# Patient Record
Sex: FEMALE | Race: ASIAN | NOT HISPANIC OR LATINO | URBAN - METROPOLITAN AREA
[De-identification: names, ages, dates, MRNs, and addresses within clinical notes are randomized per-mention and may not be internally consistent; named-entity substitution may affect disease eponyms.]

---

## 2021-04-23 ENCOUNTER — INPATIENT (INPATIENT)
Facility: HOSPITAL | Age: 81
LOS: 4 days | Discharge: HOME | End: 2021-04-28
Attending: HOSPITALIST | Admitting: HOSPITALIST
Payer: MEDICARE

## 2021-04-23 VITALS
TEMPERATURE: 98 F | RESPIRATION RATE: 20 BRPM | OXYGEN SATURATION: 88 % | WEIGHT: 128.09 LBS | DIASTOLIC BLOOD PRESSURE: 72 MMHG | HEART RATE: 107 BPM | SYSTOLIC BLOOD PRESSURE: 135 MMHG

## 2021-04-23 PROCEDURE — 99285 EMERGENCY DEPT VISIT HI MDM: CPT | Mod: CS

## 2021-04-23 RX ORDER — ACETAMINOPHEN 500 MG
650 TABLET ORAL ONCE
Refills: 0 | Status: COMPLETED | OUTPATIENT
Start: 2021-04-23 | End: 2021-04-23

## 2021-04-23 RX ORDER — SODIUM CHLORIDE 9 MG/ML
1000 INJECTION, SOLUTION INTRAVENOUS ONCE
Refills: 0 | Status: COMPLETED | OUTPATIENT
Start: 2021-04-23 | End: 2021-04-23

## 2021-04-23 NOTE — ED ADULT NURSE NOTE - OBJECTIVE STATEMENT
patient presents c/o weakness x 2-3 weeks a/w sob, worsening today with pulse ox in 80s at home. has been ambulating independently around house, denies contacts w/ anyone covid +. o2 improved to 94% on 2L NC. denies cp/n/v/d.

## 2021-04-23 NOTE — ED ADULT TRIAGE NOTE - CHIEF COMPLAINT QUOTE
Pt came c/o body aches, cough and dyspnea x 3 weeks that got worse today, pt's SPO2 in triage is 88% on RA

## 2021-04-24 DIAGNOSIS — R09.89 OTHER SPECIFIED SYMPTOMS AND SIGNS INVOLVING THE CIRCULATORY AND RESPIRATORY SYSTEMS: ICD-10-CM

## 2021-04-24 LAB
ALBUMIN SERPL ELPH-MCNC: 3.5 G/DL — SIGNIFICANT CHANGE UP (ref 3.5–5.2)
ALBUMIN SERPL ELPH-MCNC: 3.6 G/DL — SIGNIFICANT CHANGE UP (ref 3.5–5.2)
ALP SERPL-CCNC: 76 U/L — SIGNIFICANT CHANGE UP (ref 30–115)
ALP SERPL-CCNC: 76 U/L — SIGNIFICANT CHANGE UP (ref 30–115)
ALT FLD-CCNC: 19 U/L — SIGNIFICANT CHANGE UP (ref 0–41)
ALT FLD-CCNC: 20 U/L — SIGNIFICANT CHANGE UP (ref 0–41)
ANION GAP SERPL CALC-SCNC: 15 MMOL/L — HIGH (ref 7–14)
ANION GAP SERPL CALC-SCNC: 15 MMOL/L — HIGH (ref 7–14)
AST SERPL-CCNC: 26 U/L — SIGNIFICANT CHANGE UP (ref 0–41)
AST SERPL-CCNC: 29 U/L — SIGNIFICANT CHANGE UP (ref 0–41)
BASE EXCESS BLDA CALC-SCNC: 1.8 MMOL/L — SIGNIFICANT CHANGE UP (ref -2–2)
BASOPHILS # BLD AUTO: 0.01 K/UL — SIGNIFICANT CHANGE UP (ref 0–0.2)
BASOPHILS # BLD AUTO: 0.02 K/UL — SIGNIFICANT CHANGE UP (ref 0–0.2)
BASOPHILS NFR BLD AUTO: 0.2 % — SIGNIFICANT CHANGE UP (ref 0–1)
BASOPHILS NFR BLD AUTO: 0.3 % — SIGNIFICANT CHANGE UP (ref 0–1)
BILIRUB SERPL-MCNC: 0.7 MG/DL — SIGNIFICANT CHANGE UP (ref 0.2–1.2)
BILIRUB SERPL-MCNC: 0.9 MG/DL — SIGNIFICANT CHANGE UP (ref 0.2–1.2)
BUN SERPL-MCNC: 17 MG/DL — SIGNIFICANT CHANGE UP (ref 10–20)
BUN SERPL-MCNC: 19 MG/DL — SIGNIFICANT CHANGE UP (ref 10–20)
CALCIUM SERPL-MCNC: 8.9 MG/DL — SIGNIFICANT CHANGE UP (ref 8.5–10.1)
CALCIUM SERPL-MCNC: 9.1 MG/DL — SIGNIFICANT CHANGE UP (ref 8.5–10.1)
CHLORIDE SERPL-SCNC: 103 MMOL/L — SIGNIFICANT CHANGE UP (ref 98–110)
CHLORIDE SERPL-SCNC: 99 MMOL/L — SIGNIFICANT CHANGE UP (ref 98–110)
CO2 SERPL-SCNC: 20 MMOL/L — SIGNIFICANT CHANGE UP (ref 17–32)
CO2 SERPL-SCNC: 21 MMOL/L — SIGNIFICANT CHANGE UP (ref 17–32)
CREAT SERPL-MCNC: 0.6 MG/DL — LOW (ref 0.7–1.5)
CREAT SERPL-MCNC: 0.8 MG/DL — SIGNIFICANT CHANGE UP (ref 0.7–1.5)
CRP SERPL-MCNC: 94 MG/L — HIGH
D DIMER BLD IA.RAPID-MCNC: 1555 NG/ML DDU — HIGH (ref 0–230)
EOSINOPHIL # BLD AUTO: 0 K/UL — SIGNIFICANT CHANGE UP (ref 0–0.7)
EOSINOPHIL # BLD AUTO: 0.08 K/UL — SIGNIFICANT CHANGE UP (ref 0–0.7)
EOSINOPHIL NFR BLD AUTO: 0 % — SIGNIFICANT CHANGE UP (ref 0–8)
EOSINOPHIL NFR BLD AUTO: 1 % — SIGNIFICANT CHANGE UP (ref 0–8)
GAS PNL BLDA: SIGNIFICANT CHANGE UP
GLUCOSE BLDC GLUCOMTR-MCNC: 134 MG/DL — HIGH (ref 70–99)
GLUCOSE BLDC GLUCOMTR-MCNC: 162 MG/DL — HIGH (ref 70–99)
GLUCOSE BLDC GLUCOMTR-MCNC: 176 MG/DL — HIGH (ref 70–99)
GLUCOSE BLDC GLUCOMTR-MCNC: 204 MG/DL — HIGH (ref 70–99)
GLUCOSE SERPL-MCNC: 185 MG/DL — HIGH (ref 70–99)
GLUCOSE SERPL-MCNC: 185 MG/DL — HIGH (ref 70–99)
HCO3 BLDA-SCNC: 25 MMOL/L — SIGNIFICANT CHANGE UP (ref 23–27)
HCT VFR BLD CALC: 38.5 % — SIGNIFICANT CHANGE UP (ref 37–47)
HCT VFR BLD CALC: 39.3 % — SIGNIFICANT CHANGE UP (ref 37–47)
HGB BLD-MCNC: 13.1 G/DL — SIGNIFICANT CHANGE UP (ref 12–16)
HGB BLD-MCNC: 13.3 G/DL — SIGNIFICANT CHANGE UP (ref 12–16)
HOROWITZ INDEX BLDA+IHG-RTO: 32 — SIGNIFICANT CHANGE UP
IMM GRANULOCYTES NFR BLD AUTO: 0.5 % — HIGH (ref 0.1–0.3)
IMM GRANULOCYTES NFR BLD AUTO: 0.8 % — HIGH (ref 0.1–0.3)
LYMPHOCYTES # BLD AUTO: 0.53 K/UL — LOW (ref 1.2–3.4)
LYMPHOCYTES # BLD AUTO: 0.81 K/UL — LOW (ref 1.2–3.4)
LYMPHOCYTES # BLD AUTO: 10.2 % — LOW (ref 20.5–51.1)
LYMPHOCYTES # BLD AUTO: 9.4 % — LOW (ref 20.5–51.1)
MCHC RBC-ENTMCNC: 32.4 PG — HIGH (ref 27–31)
MCHC RBC-ENTMCNC: 32.5 PG — HIGH (ref 27–31)
MCHC RBC-ENTMCNC: 33.8 G/DL — SIGNIFICANT CHANGE UP (ref 32–37)
MCHC RBC-ENTMCNC: 34 G/DL — SIGNIFICANT CHANGE UP (ref 32–37)
MCV RBC AUTO: 95.3 FL — SIGNIFICANT CHANGE UP (ref 81–99)
MCV RBC AUTO: 96.1 FL — SIGNIFICANT CHANGE UP (ref 81–99)
MONOCYTES # BLD AUTO: 0.1 K/UL — SIGNIFICANT CHANGE UP (ref 0.1–0.6)
MONOCYTES # BLD AUTO: 0.44 K/UL — SIGNIFICANT CHANGE UP (ref 0.1–0.6)
MONOCYTES NFR BLD AUTO: 1.8 % — SIGNIFICANT CHANGE UP (ref 1.7–9.3)
MONOCYTES NFR BLD AUTO: 5.6 % — SIGNIFICANT CHANGE UP (ref 1.7–9.3)
NEUTROPHILS # BLD AUTO: 4.95 K/UL — SIGNIFICANT CHANGE UP (ref 1.4–6.5)
NEUTROPHILS # BLD AUTO: 6.5 K/UL — SIGNIFICANT CHANGE UP (ref 1.4–6.5)
NEUTROPHILS NFR BLD AUTO: 82.1 % — HIGH (ref 42.2–75.2)
NEUTROPHILS NFR BLD AUTO: 88.1 % — HIGH (ref 42.2–75.2)
NRBC # BLD: 0 /100 WBCS — SIGNIFICANT CHANGE UP (ref 0–0)
NRBC # BLD: 0 /100 WBCS — SIGNIFICANT CHANGE UP (ref 0–0)
PCO2 BLDA: 36 MMHG — LOW (ref 38–42)
PH BLDA: 7.46 — HIGH (ref 7.38–7.42)
PLATELET # BLD AUTO: 411 K/UL — HIGH (ref 130–400)
PLATELET # BLD AUTO: 414 K/UL — HIGH (ref 130–400)
PO2 BLDA: 74 MMHG — LOW (ref 78–95)
POTASSIUM SERPL-MCNC: 4 MMOL/L — SIGNIFICANT CHANGE UP (ref 3.5–5)
POTASSIUM SERPL-MCNC: 4.1 MMOL/L — SIGNIFICANT CHANGE UP (ref 3.5–5)
POTASSIUM SERPL-SCNC: 4 MMOL/L — SIGNIFICANT CHANGE UP (ref 3.5–5)
POTASSIUM SERPL-SCNC: 4.1 MMOL/L — SIGNIFICANT CHANGE UP (ref 3.5–5)
PROCALCITONIN SERPL-MCNC: 0.08 NG/ML — SIGNIFICANT CHANGE UP (ref 0.02–0.1)
PROT SERPL-MCNC: 6.4 G/DL — SIGNIFICANT CHANGE UP (ref 6–8)
PROT SERPL-MCNC: 6.9 G/DL — SIGNIFICANT CHANGE UP (ref 6–8)
RAPID RVP RESULT: DETECTED
RBC # BLD: 4.04 M/UL — LOW (ref 4.2–5.4)
RBC # BLD: 4.09 M/UL — LOW (ref 4.2–5.4)
RBC # FLD: 12.7 % — SIGNIFICANT CHANGE UP (ref 11.5–14.5)
RBC # FLD: 12.8 % — SIGNIFICANT CHANGE UP (ref 11.5–14.5)
SAO2 % BLDA: 96 % — SIGNIFICANT CHANGE UP (ref 94–98)
SARS-COV-2 RNA SPEC QL NAA+PROBE: DETECTED
SODIUM SERPL-SCNC: 135 MMOL/L — SIGNIFICANT CHANGE UP (ref 135–146)
SODIUM SERPL-SCNC: 138 MMOL/L — SIGNIFICANT CHANGE UP (ref 135–146)
WBC # BLD: 5.62 K/UL — SIGNIFICANT CHANGE UP (ref 4.8–10.8)
WBC # BLD: 7.91 K/UL — SIGNIFICANT CHANGE UP (ref 4.8–10.8)
WBC # FLD AUTO: 5.62 K/UL — SIGNIFICANT CHANGE UP (ref 4.8–10.8)
WBC # FLD AUTO: 7.91 K/UL — SIGNIFICANT CHANGE UP (ref 4.8–10.8)

## 2021-04-24 PROCEDURE — 93010 ELECTROCARDIOGRAM REPORT: CPT

## 2021-04-24 PROCEDURE — 71045 X-RAY EXAM CHEST 1 VIEW: CPT | Mod: 26

## 2021-04-24 PROCEDURE — 93970 EXTREMITY STUDY: CPT | Mod: 26

## 2021-04-24 PROCEDURE — 71275 CT ANGIOGRAPHY CHEST: CPT | Mod: 26,MA

## 2021-04-24 PROCEDURE — 99223 1ST HOSP IP/OBS HIGH 75: CPT | Mod: CS

## 2021-04-24 RX ORDER — DEXTROSE 50 % IN WATER 50 %
25 SYRINGE (ML) INTRAVENOUS ONCE
Refills: 0 | Status: DISCONTINUED | OUTPATIENT
Start: 2021-04-24 | End: 2021-04-28

## 2021-04-24 RX ORDER — DEXTROSE 50 % IN WATER 50 %
12.5 SYRINGE (ML) INTRAVENOUS ONCE
Refills: 0 | Status: DISCONTINUED | OUTPATIENT
Start: 2021-04-24 | End: 2021-04-28

## 2021-04-24 RX ORDER — SIMVASTATIN 20 MG/1
40 TABLET, FILM COATED ORAL AT BEDTIME
Refills: 0 | Status: DISCONTINUED | OUTPATIENT
Start: 2021-04-24 | End: 2021-04-28

## 2021-04-24 RX ORDER — DEXTROSE 50 % IN WATER 50 %
15 SYRINGE (ML) INTRAVENOUS ONCE
Refills: 0 | Status: DISCONTINUED | OUTPATIENT
Start: 2021-04-24 | End: 2021-04-28

## 2021-04-24 RX ORDER — DEXAMETHASONE 0.5 MG/5ML
6 ELIXIR ORAL ONCE
Refills: 0 | Status: COMPLETED | OUTPATIENT
Start: 2021-04-24 | End: 2021-04-24

## 2021-04-24 RX ORDER — LISINOPRIL 2.5 MG/1
10 TABLET ORAL DAILY
Refills: 0 | Status: DISCONTINUED | OUTPATIENT
Start: 2021-04-24 | End: 2021-04-28

## 2021-04-24 RX ORDER — SODIUM CHLORIDE 9 MG/ML
1000 INJECTION, SOLUTION INTRAVENOUS
Refills: 0 | Status: DISCONTINUED | OUTPATIENT
Start: 2021-04-24 | End: 2021-04-28

## 2021-04-24 RX ORDER — GLUCAGON INJECTION, SOLUTION 0.5 MG/.1ML
1 INJECTION, SOLUTION SUBCUTANEOUS ONCE
Refills: 0 | Status: DISCONTINUED | OUTPATIENT
Start: 2021-04-24 | End: 2021-04-28

## 2021-04-24 RX ORDER — PANTOPRAZOLE SODIUM 20 MG/1
40 TABLET, DELAYED RELEASE ORAL
Refills: 0 | Status: DISCONTINUED | OUTPATIENT
Start: 2021-04-24 | End: 2021-04-28

## 2021-04-24 RX ORDER — CHLORHEXIDINE GLUCONATE 213 G/1000ML
1 SOLUTION TOPICAL
Refills: 0 | Status: DISCONTINUED | OUTPATIENT
Start: 2021-04-24 | End: 2021-04-28

## 2021-04-24 RX ORDER — INSULIN LISPRO 100/ML
VIAL (ML) SUBCUTANEOUS
Refills: 0 | Status: DISCONTINUED | OUTPATIENT
Start: 2021-04-24 | End: 2021-04-28

## 2021-04-24 RX ORDER — ENOXAPARIN SODIUM 100 MG/ML
40 INJECTION SUBCUTANEOUS DAILY
Refills: 0 | Status: DISCONTINUED | OUTPATIENT
Start: 2021-04-24 | End: 2021-04-26

## 2021-04-24 RX ORDER — AMLODIPINE BESYLATE 2.5 MG/1
5 TABLET ORAL DAILY
Refills: 0 | Status: DISCONTINUED | OUTPATIENT
Start: 2021-04-24 | End: 2021-04-28

## 2021-04-24 RX ORDER — DEXAMETHASONE 0.5 MG/5ML
6 ELIXIR ORAL DAILY
Refills: 0 | Status: DISCONTINUED | OUTPATIENT
Start: 2021-04-25 | End: 2021-04-28

## 2021-04-24 RX ADMIN — Medication 650 MILLIGRAM(S): at 00:03

## 2021-04-24 RX ADMIN — ENOXAPARIN SODIUM 40 MILLIGRAM(S): 100 INJECTION SUBCUTANEOUS at 11:11

## 2021-04-24 RX ADMIN — Medication 1: at 11:12

## 2021-04-24 RX ADMIN — Medication 6 MILLIGRAM(S): at 05:58

## 2021-04-24 RX ADMIN — SIMVASTATIN 40 MILLIGRAM(S): 20 TABLET, FILM COATED ORAL at 21:20

## 2021-04-24 RX ADMIN — Medication 2: at 17:06

## 2021-04-24 RX ADMIN — AMLODIPINE BESYLATE 5 MILLIGRAM(S): 2.5 TABLET ORAL at 11:11

## 2021-04-24 RX ADMIN — SODIUM CHLORIDE 1000 MILLILITER(S): 9 INJECTION, SOLUTION INTRAVENOUS at 00:03

## 2021-04-24 RX ADMIN — LISINOPRIL 10 MILLIGRAM(S): 2.5 TABLET ORAL at 17:17

## 2021-04-24 RX ADMIN — PANTOPRAZOLE SODIUM 40 MILLIGRAM(S): 20 TABLET, DELAYED RELEASE ORAL at 11:10

## 2021-04-24 NOTE — ED PROVIDER NOTE - OBJECTIVE STATEMENT
81yo F with PMH of DM on Metformin presenting to ED with myalgias and cough x 3 weeks and SOB today. Patient's  is ill and in the hospital with covid. Denies any f/c, CP, abd pain, back pain, n/v/d, rashes, calf pain/swelling, or injuries/traumas/falls. Nonsmoker.

## 2021-04-24 NOTE — ED PROVIDER NOTE - ATTENDING CONTRIBUTION TO CARE
I personally evaluated the patient. I reviewed the Resident’s or Physician Assistant’s note (as assigned above), and agree with the findings and plan except as documented in my note.  80 F with hx of DM presents with several days of body aches associated with coughing. Pt denies fever, CP, SOB, no lightheadedness, no LOC. + sick contact in , no Covid test done recently. VS reviewed and pt is hypoxic on RA, pt non-toxic appearing, NAD. Head ncat, MMM, neck supple, normal ROM, normal s1s2 without any murmurs, Lungs CTAB with normal work of breathing. abd +BS, s/nd/nt, extremities wnl, neuro exam grossly normal. No acute skin rashes. Plan is ekg, labs, imaging, meds as needed and manage accordingly.

## 2021-04-24 NOTE — ED PROVIDER NOTE - CLINICAL SUMMARY MEDICAL DECISION MAKING FREE TEXT BOX
Pt presented with coughing and hypoxia. Required ekg, labs, imaging. Will be admitted for further management.

## 2021-04-24 NOTE — ED PROVIDER NOTE - PROGRESS NOTE DETAILS
Bob- Patient comfortable on NC 3L with O2 sat in 90's. CTA negative for PE. Patient will need to be admitted, agrees with plan.

## 2021-04-24 NOTE — ED PROVIDER NOTE - PHYSICAL EXAMINATION
PHYSICAL EXAM: I have reviewed current vital signs.  GENERAL: NAD, well-developed, appears tired laying in the stretcher.  HEAD:  Normocephalic, atraumatic.  EYES: Conjunctiva and sclera clear.  ENT: Dry MM.  NECK: Supple, FROM.  CHEST/LUNG: Clear to auscultation bilaterally; no wheezes, rales, or rhonchi.  HEART: Regular rate and rhythm, normal S1 and S2; no murmurs, rubs, or gallops.  ABDOMEN: Soft, nontender, nondistended.  EXTREMITIES:  2+ peripheral pulses; FROM.  NEUROLOGY: A&O x 3. Motor 5/5. No focal neurological deficits.   SKIN: Warm and dry.

## 2021-04-24 NOTE — H&P ADULT - HISTORY OF PRESENT ILLNESS
79 yo F with pmh of of DMII, HLD, HTN presents to the hospital with 3 weeks of generalized weakness, myalgias and non-productive cough. The patient reports that her symptoms were stable until she developed dyspnea on the day of presentation to the hospital. Upon arrival to the hospital her SpO2 was 88%on room air and she was placed on 3 L n/s with improvement in saturation to 97% and subjective improvement in dyspnea. The patient notes     T(C): 36.9 , HR: 68 , BP: 106/72 , RR: 20, SpO2: 97% on 3L N/C  Labs: (+) covid19 , d-dimer 1555  EKG: NSR     81 yo F with pmh of of DMII, HLD, HTN presents to the hospital with 3 weeks of generalized weakness, myalgias and  cough productive of yellow sputum. The patient reports that her symptoms were stable until she developed dyspnea on the day of presentation to the hospital. Upon arrival to the hospital her SpO2 was 88% on room air and she was placed on 3 L n/s with improvement in saturation to 97% and subjective improvement in dyspnea, however she still feels weak and tired and has not been eating much in the last few days due to decreased appetite. She notes that at baseline she is fully functional and ambulates without assistance.  She denies any chills, fevers, abd pain, constipation, diarrhea, dysuria, wt loss, loss of taste/smell. all ros negative except as above.     T(C): 36.9 , HR: 68 , BP: 106/72 , RR: 20, SpO2: 97% on 3L N/C  Labs: (+) covid19 , d-dimer 1555  EKG: NSR

## 2021-04-24 NOTE — H&P ADULT - ASSESSMENT
79 yo F with pmh of of DMII, HLD, HTN presents to the hospital with 3 weeks of generalized weakness, myalgias and non-productive cough now with shortness of breath, found to have covid19.      # Acute hypoxic respiratory failure secondary to COVID19  - SpO2 88% on room air , now 3L n/c 97%  - CXR :   - CTA negative for PE , + b/l patchy groundglass opacities   - baseline procal, ferritin, ldh, cpk, trop, coags, crp ; can repeat q48h if clinically worsening  - d-dimer 1555 , CTA as above , can repeat q48h if clinically worsening  - incentive spirometry  - covid antibodies f/u  - decadron 6 mg qd   - not a candidate for remdesivir/ plasma given symptom onset 3 weeks ago  - f/u infectious disease consult  - DVT ppx per protocol      # Elevated D-dimer  - CTA negative for pe  - f/u b/l LE Duplex     # h/o HTN , c/w home medications, hold for systolic pressure of <100    # h/o HLD , c/w simvastatin    # h/o DM   - hold home po medications  - fingerstick ac, qhs  - sliding scale , basal bolus if > 180   - carb consistent diet  - f/u HbA1c    # DVT PPX:   # GI PPX: PPI  # Activity: as tolerated  # CHG BATH  # Diet: carb consistent  79 yo F with pmh of of DMII, HLD, HTN presents to the hospital with 3 weeks of generalized weakness, myalgias and non-productive cough now with shortness of breath, found to have covid19.      # Acute hypoxic respiratory failure secondary to COVID19  - SpO2 88% on room air , now 3L n/c 97%  - CXR : b/l diffuse peripheral opacities , f/u official read   - CTA negative for PE , + b/l patchy groundglass opacities   - baseline procal, ferritin, ldh, cpk, trop, coags, crp ; can repeat q48h if clinically worsening  - f/u baseline ABG   - d-dimer 1555 , CTA as above , can repeat q48h if clinically worsening  - incentive spirometry  - covid antibodies f/u , doubt of much use given symptom onset 3 weeks ago   - decadron 6 mg qd   - not a candidate for remdesivir/ plasma given symptom onset 3 weeks ago  - f/u infectious disease consult  - DVT ppx per protocol    # Elevated D-dimer  - CTA negative for pe  - f/u b/l LE Duplex     # h/o HTN , c/w home medications, hold for systolic pressure of <100    # h/o HLD , c/w simvastatin    # h/o DM   - hold home po medications  - fingerstick ac, qhs  - sliding scale , basal bolus if > 180   - carb consistent diet  - f/u HbA1c    # DVT PPX:   # GI PPX: PPI  # Activity: as tolerated  # CHG BATH  # Diet: carb consistent

## 2021-04-24 NOTE — ED PROVIDER NOTE - NS ED ROS FT
Constitutional:  No fevers or chills.  Eyes:  No visual changes.  ENT:  No hearing changes. No sore throat.  Neck:  No neck pain or stiffness.  Cardiac:  No CP or edema.  Resp:  +Cough/SOB. No hemoptysis.   GI:  No nausea, vomiting, diarrhea, or abdominal pain.  :  No dysuria, frequency, or hematuria.  MSK:  +Myalgias.  Neuro:  No headache. +Weakness.  Skin:  No skin rash.

## 2021-04-24 NOTE — H&P ADULT - NSHPLABSRESULTS_GEN_ALL_CORE
13.3   7.91  )-----------( 411      ( 23 Apr 2021 23:49 )             39.3     04-23-21 @ 23:49    135  |  99  |  19             --------------------------< 185<H>     4.1  |  21  | 0.8    eGFR AA: 81  eGFR N-AA: 70    Calcium: 9.1  Phosphorus: --  Magnesium: --    AST: 29    ALT: 20  AlkPhos: 76  Protein: 6.9  Albumin: 3.6  TBili: 0.9  D-Bili: --    D-Dimer Assay, Quantitative (04.23.21 @ 23:49)    D-Dimer Assay, Quantitative: 1555: Manufacturers recommended Cut off for VTE is 230 ng/ml D-DU ng/mL DDU    < from: CT Angio Chest PE Protocol w/ IV Cont (04.24.21 @ 04:08) >    IMPRESSION:    1. Bilateral patchy predominantly groundglass airspace opacities, consistent with viral pneumonia in the appropriate clinical setting.    2. No evidence of acute pulmonary embolism.    < end of copied text >

## 2021-04-24 NOTE — H&P ADULT - ATTENDING COMMENTS
79 yo F with pmh of of DMII, HLD, HTN presents to the hospital with 3 weeks of generalized weakness, myalgias and non-productive cough now with shortness of breath, found to have covid19.      # Acute hypoxic respiratory failure secondary to COVID19  - On admission : SpO2 88% on room air > 3L n/c 97%  - CXR : b/l diffuse peripheral opacities ,  - CTA negative for PE , + b/l patchy groundglass opacities   - Get baseline procal, ferritin, ldh, cpk, trop, coags, crp ; can repeat q48h if clinically worsening  - baseline ABG reviewed.   - d-dimer 1555 , CTA as above , can repeat q48h if clinically worsening  - incentive spirometry  - covid antibodies f/u , doubt of much use given symptom onset 3 weeks ago   - decadron 6 mg qd   - not a candidate for remdesivir/ plasma given symptom onset 3 weeks ago  - f/u infectious disease consult  - DVT ppx per protocol    # Elevated D-dimer  - CTA negative for pe  - f/u b/l LE Duplex     # h/o HTN , c/w home medications, hold for systolic pressure of <100    # h/o HLD , c/w simvastatin    # h/o DM   - hold home po medications  - fingerstick ac, qhs  - sliding scale , basal bolus if > 180   - carb consistent diet  - f/u HbA1c    # DVT PPX:   # GI PPX: PPI  # Activity: as tolerated  # CHG BATH  # Diet: carb consistent

## 2021-04-24 NOTE — H&P ADULT - NSHPPHYSICALEXAM_GEN_ALL_CORE
Vital Signs (24 Hrs):  T(C): 36.9 (04-24-21 @ 04:56), Max: 37.4 (04-23-21 @ 23:50)  HR: 68 (04-24-21 @ 04:56) (68 - 107)  BP: 106/72 (04-24-21 @ 04:56) (106/72 - 135/72)  RR: 20 (04-24-21 @ 04:56) (20 - 20)  SpO2: 97% (04-24-21 @ 04:56) (88% - 97%) on 3 L n/c Vital Signs (24 Hrs):  T(C): 36.9 (04-24-21 @ 04:56), Max: 37.4 (04-23-21 @ 23:50)  HR: 68 (04-24-21 @ 04:56) (68 - 107)  BP: 106/72 (04-24-21 @ 04:56) (106/72 - 135/72)  RR: 20 (04-24-21 @ 04:56) (20 - 20)  SpO2: 97% (04-24-21 @ 04:56) (88% - 97%) on 3 L n/c    PHYSICAL EXAM:  GENERAL: NAD, lying in bed comfortably  HEAD:  Atraumatic, Normocephalic  EYES: EOMI, PERRLA, conjunctiva and sclera clear  ENT: Moist mucous membranes  NECK: Supple, No JVD  CHEST/LUNG: decreased b/l breath sounds  HEART: Regular rate and rhythm; No murmurs, rubs, or gallops  ABDOMEN: Bowel sounds present; Soft, Nontender, Nondistended. No hepatomegally  EXTREMITIES:  2+ Peripheral Pulses, brisk capillary refill. No clubbing, cyanosis, or edema  NERVOUS SYSTEM:  Alert & Oriented X3, speech clear. No deficits   MSK: FROM all 4 extremities, full and equal strength  SKIN: No rashes or lesions

## 2021-04-25 LAB
ALBUMIN SERPL ELPH-MCNC: 3.4 G/DL — LOW (ref 3.5–5.2)
ALP SERPL-CCNC: 78 U/L — SIGNIFICANT CHANGE UP (ref 30–115)
ALT FLD-CCNC: 17 U/L — SIGNIFICANT CHANGE UP (ref 0–41)
ANION GAP SERPL CALC-SCNC: 12 MMOL/L — SIGNIFICANT CHANGE UP (ref 7–14)
APTT BLD: 25.6 SEC — LOW (ref 27–39.2)
AST SERPL-CCNC: 23 U/L — SIGNIFICANT CHANGE UP (ref 0–41)
BASOPHILS # BLD AUTO: 0.01 K/UL — SIGNIFICANT CHANGE UP (ref 0–0.2)
BASOPHILS NFR BLD AUTO: 0.2 % — SIGNIFICANT CHANGE UP (ref 0–1)
BILIRUB SERPL-MCNC: 0.5 MG/DL — SIGNIFICANT CHANGE UP (ref 0.2–1.2)
BUN SERPL-MCNC: 19 MG/DL — SIGNIFICANT CHANGE UP (ref 10–20)
CALCIUM SERPL-MCNC: 8.7 MG/DL — SIGNIFICANT CHANGE UP (ref 8.5–10.1)
CHLORIDE SERPL-SCNC: 105 MMOL/L — SIGNIFICANT CHANGE UP (ref 98–110)
CK SERPL-CCNC: 50 U/L — SIGNIFICANT CHANGE UP (ref 0–225)
CO2 SERPL-SCNC: 24 MMOL/L — SIGNIFICANT CHANGE UP (ref 17–32)
CREAT SERPL-MCNC: 0.6 MG/DL — LOW (ref 0.7–1.5)
EOSINOPHIL # BLD AUTO: 0 K/UL — SIGNIFICANT CHANGE UP (ref 0–0.7)
EOSINOPHIL NFR BLD AUTO: 0 % — SIGNIFICANT CHANGE UP (ref 0–8)
GLUCOSE BLDC GLUCOMTR-MCNC: 170 MG/DL — HIGH (ref 70–99)
GLUCOSE BLDC GLUCOMTR-MCNC: 205 MG/DL — HIGH (ref 70–99)
GLUCOSE BLDC GLUCOMTR-MCNC: 285 MG/DL — HIGH (ref 70–99)
GLUCOSE BLDC GLUCOMTR-MCNC: 333 MG/DL — HIGH (ref 70–99)
GLUCOSE SERPL-MCNC: 175 MG/DL — HIGH (ref 70–99)
HCT VFR BLD CALC: 38.2 % — SIGNIFICANT CHANGE UP (ref 37–47)
HGB BLD-MCNC: 13.2 G/DL — SIGNIFICANT CHANGE UP (ref 12–16)
IMM GRANULOCYTES NFR BLD AUTO: 0.5 % — HIGH (ref 0.1–0.3)
INR BLD: 1.05 RATIO — SIGNIFICANT CHANGE UP (ref 0.65–1.3)
LDH SERPL L TO P-CCNC: 367 — HIGH (ref 50–242)
LYMPHOCYTES # BLD AUTO: 0.87 K/UL — LOW (ref 1.2–3.4)
LYMPHOCYTES # BLD AUTO: 13.4 % — LOW (ref 20.5–51.1)
MCHC RBC-ENTMCNC: 32.8 PG — HIGH (ref 27–31)
MCHC RBC-ENTMCNC: 34.6 G/DL — SIGNIFICANT CHANGE UP (ref 32–37)
MCV RBC AUTO: 95 FL — SIGNIFICANT CHANGE UP (ref 81–99)
MONOCYTES # BLD AUTO: 0.42 K/UL — SIGNIFICANT CHANGE UP (ref 0.1–0.6)
MONOCYTES NFR BLD AUTO: 6.5 % — SIGNIFICANT CHANGE UP (ref 1.7–9.3)
NEUTROPHILS # BLD AUTO: 5.14 K/UL — SIGNIFICANT CHANGE UP (ref 1.4–6.5)
NEUTROPHILS NFR BLD AUTO: 79.4 % — HIGH (ref 42.2–75.2)
NRBC # BLD: 0 /100 WBCS — SIGNIFICANT CHANGE UP (ref 0–0)
PLATELET # BLD AUTO: 453 K/UL — HIGH (ref 130–400)
POTASSIUM SERPL-MCNC: 4.5 MMOL/L — SIGNIFICANT CHANGE UP (ref 3.5–5)
POTASSIUM SERPL-SCNC: 4.5 MMOL/L — SIGNIFICANT CHANGE UP (ref 3.5–5)
PROT SERPL-MCNC: 6.4 G/DL — SIGNIFICANT CHANGE UP (ref 6–8)
PROTHROM AB SERPL-ACNC: 12.1 SEC — SIGNIFICANT CHANGE UP (ref 9.95–12.87)
RBC # BLD: 4.02 M/UL — LOW (ref 4.2–5.4)
RBC # FLD: 12.5 % — SIGNIFICANT CHANGE UP (ref 11.5–14.5)
SODIUM SERPL-SCNC: 141 MMOL/L — SIGNIFICANT CHANGE UP (ref 135–146)
TROPONIN T SERPL-MCNC: <0.01 NG/ML — SIGNIFICANT CHANGE UP
WBC # BLD: 6.47 K/UL — SIGNIFICANT CHANGE UP (ref 4.8–10.8)
WBC # FLD AUTO: 6.47 K/UL — SIGNIFICANT CHANGE UP (ref 4.8–10.8)

## 2021-04-25 PROCEDURE — 99233 SBSQ HOSP IP/OBS HIGH 50: CPT | Mod: CS

## 2021-04-25 RX ORDER — INSULIN LISPRO 100/ML
4 VIAL (ML) SUBCUTANEOUS ONCE
Refills: 0 | Status: COMPLETED | OUTPATIENT
Start: 2021-04-25 | End: 2021-04-25

## 2021-04-25 RX ADMIN — Medication 4 UNIT(S): at 21:30

## 2021-04-25 RX ADMIN — Medication 6 MILLIGRAM(S): at 05:11

## 2021-04-25 RX ADMIN — ENOXAPARIN SODIUM 40 MILLIGRAM(S): 100 INJECTION SUBCUTANEOUS at 11:19

## 2021-04-25 RX ADMIN — Medication 2: at 11:19

## 2021-04-25 RX ADMIN — SIMVASTATIN 40 MILLIGRAM(S): 20 TABLET, FILM COATED ORAL at 21:06

## 2021-04-25 RX ADMIN — PANTOPRAZOLE SODIUM 40 MILLIGRAM(S): 20 TABLET, DELAYED RELEASE ORAL at 05:11

## 2021-04-25 RX ADMIN — AMLODIPINE BESYLATE 5 MILLIGRAM(S): 2.5 TABLET ORAL at 05:11

## 2021-04-25 RX ADMIN — Medication 3: at 17:11

## 2021-04-25 RX ADMIN — Medication 1: at 07:49

## 2021-04-25 RX ADMIN — LISINOPRIL 10 MILLIGRAM(S): 2.5 TABLET ORAL at 05:11

## 2021-04-25 NOTE — PROGRESS NOTE ADULT - ASSESSMENT
81 yo F with pmh of of DMII, HLD, HTN presents to the hospital with 3 weeks of generalized weakness, myalgias and non-productive cough now with shortness of breath, found to have covid19.      # Acute hypoxic respiratory failure secondary to COVID19  - On admission : SpO2 88% on room air > 3L n/c 97%  - CXR : b/l diffuse peripheral opacities ,  - CTA negative for PE , + b/l patchy groundglass opacities   - Get baseline procal, ferritin, ldh, cpk, trop, coags, crp ; can repeat q48h if clinically worsening  - baseline ABG reviewed.   - d-dimer 1555 , CTA as above , can repeat q48h if clinically worsening  Markers:   CRP 94 (4/23)   (4/25)  Ferritin pending  - incentive spirometry  - covid antibodies PENDING, doubt of much use given symptom onset 3 weeks ago     - decadron 6 mg qd   - not a candidate for remdesivir/ plasma given symptom onset 3 weeks ago  - f/u infectious disease consult  - DVT ppx per protocol    4/25: 2L NC. feels well. walking. c/w dexa. monitor markers. keep her walking    # Elevated D-dimer  - CTA negative for pe  - f/u b/l LE Duplex     # h/o HTN , c/w home medications, hold for systolic pressure of <100    # h/o HLD , c/w simvastatin    # h/o DM   - hold home po medications  - fingerstick ac, qhs  - sliding scale , basal bolus if > 180   - carb consistent diet  - f/u HbA1c    # DVT PPX:   # GI PPX: PPI  # Activity: as tolerated  # CHG BATH  # Diet: carb consistent .  Dispo: Still on O2

## 2021-04-26 LAB
A1C WITH ESTIMATED AVERAGE GLUCOSE RESULT: 8 % — HIGH (ref 4–5.6)
ALBUMIN SERPL ELPH-MCNC: 3.4 G/DL — LOW (ref 3.5–5.2)
ALP SERPL-CCNC: 76 U/L — SIGNIFICANT CHANGE UP (ref 30–115)
ALT FLD-CCNC: 19 U/L — SIGNIFICANT CHANGE UP (ref 0–41)
ANION GAP SERPL CALC-SCNC: 13 MMOL/L — SIGNIFICANT CHANGE UP (ref 7–14)
AST SERPL-CCNC: 26 U/L — SIGNIFICANT CHANGE UP (ref 0–41)
BASOPHILS # BLD AUTO: 0.01 K/UL — SIGNIFICANT CHANGE UP (ref 0–0.2)
BASOPHILS NFR BLD AUTO: 0.1 % — SIGNIFICANT CHANGE UP (ref 0–1)
BILIRUB SERPL-MCNC: 0.5 MG/DL — SIGNIFICANT CHANGE UP (ref 0.2–1.2)
BUN SERPL-MCNC: 22 MG/DL — HIGH (ref 10–20)
CALCIUM SERPL-MCNC: 8.8 MG/DL — SIGNIFICANT CHANGE UP (ref 8.5–10.1)
CHLORIDE SERPL-SCNC: 103 MMOL/L — SIGNIFICANT CHANGE UP (ref 98–110)
CO2 SERPL-SCNC: 23 MMOL/L — SIGNIFICANT CHANGE UP (ref 17–32)
COVID-19 NUCLEOCAPSID GAM AB INTERP: POSITIVE
COVID-19 NUCLEOCAPSID TOTAL GAM ANTIBODY RESULT: 7.51 INDEX — HIGH
COVID-19 SPIKE DOMAIN AB INTERP: POSITIVE
COVID-19 SPIKE DOMAIN ANTIBODY RESULT: >250 U/ML — HIGH
CREAT SERPL-MCNC: 0.7 MG/DL — SIGNIFICANT CHANGE UP (ref 0.7–1.5)
CRP SERPL-MCNC: 26 MG/L — HIGH
EOSINOPHIL # BLD AUTO: 0 K/UL — SIGNIFICANT CHANGE UP (ref 0–0.7)
EOSINOPHIL NFR BLD AUTO: 0 % — SIGNIFICANT CHANGE UP (ref 0–8)
ESTIMATED AVERAGE GLUCOSE: 183 MG/DL — HIGH (ref 68–114)
FERRITIN SERPL-MCNC: 589 NG/ML — HIGH (ref 15–150)
FERRITIN SERPL-MCNC: 668 NG/ML — HIGH (ref 15–150)
FIBRINOGEN PPP-MCNC: 671 MG/DL — HIGH (ref 204.4–570.6)
GLUCOSE BLDC GLUCOMTR-MCNC: 180 MG/DL — HIGH (ref 70–99)
GLUCOSE BLDC GLUCOMTR-MCNC: 212 MG/DL — HIGH (ref 70–99)
GLUCOSE BLDC GLUCOMTR-MCNC: 248 MG/DL — HIGH (ref 70–99)
GLUCOSE BLDC GLUCOMTR-MCNC: 264 MG/DL — HIGH (ref 70–99)
GLUCOSE BLDC GLUCOMTR-MCNC: 269 MG/DL — HIGH (ref 70–99)
GLUCOSE SERPL-MCNC: 162 MG/DL — HIGH (ref 70–99)
HCT VFR BLD CALC: 37.2 % — SIGNIFICANT CHANGE UP (ref 37–47)
HGB BLD-MCNC: 12.6 G/DL — SIGNIFICANT CHANGE UP (ref 12–16)
IMM GRANULOCYTES NFR BLD AUTO: 0.7 % — HIGH (ref 0.1–0.3)
INR BLD: 1.03 RATIO — SIGNIFICANT CHANGE UP (ref 0.65–1.3)
LDH SERPL L TO P-CCNC: 403 — HIGH (ref 50–242)
LYMPHOCYTES # BLD AUTO: 1.25 K/UL — SIGNIFICANT CHANGE UP (ref 1.2–3.4)
LYMPHOCYTES # BLD AUTO: 15.2 % — LOW (ref 20.5–51.1)
MCHC RBC-ENTMCNC: 32.5 PG — HIGH (ref 27–31)
MCHC RBC-ENTMCNC: 33.9 G/DL — SIGNIFICANT CHANGE UP (ref 32–37)
MCV RBC AUTO: 95.9 FL — SIGNIFICANT CHANGE UP (ref 81–99)
MONOCYTES # BLD AUTO: 0.67 K/UL — HIGH (ref 0.1–0.6)
MONOCYTES NFR BLD AUTO: 8.1 % — SIGNIFICANT CHANGE UP (ref 1.7–9.3)
NEUTROPHILS # BLD AUTO: 6.25 K/UL — SIGNIFICANT CHANGE UP (ref 1.4–6.5)
NEUTROPHILS NFR BLD AUTO: 75.9 % — HIGH (ref 42.2–75.2)
NRBC # BLD: 0 /100 WBCS — SIGNIFICANT CHANGE UP (ref 0–0)
PLATELET # BLD AUTO: 468 K/UL — HIGH (ref 130–400)
POTASSIUM SERPL-MCNC: 4.3 MMOL/L — SIGNIFICANT CHANGE UP (ref 3.5–5)
POTASSIUM SERPL-SCNC: 4.3 MMOL/L — SIGNIFICANT CHANGE UP (ref 3.5–5)
PROCALCITONIN SERPL-MCNC: 0.06 NG/ML — SIGNIFICANT CHANGE UP (ref 0.02–0.1)
PROT SERPL-MCNC: 6.2 G/DL — SIGNIFICANT CHANGE UP (ref 6–8)
PROTHROM AB SERPL-ACNC: 11.8 SEC — SIGNIFICANT CHANGE UP (ref 9.95–12.87)
RBC # BLD: 3.88 M/UL — LOW (ref 4.2–5.4)
RBC # FLD: 12.4 % — SIGNIFICANT CHANGE UP (ref 11.5–14.5)
SARS-COV-2 IGG+IGM SERPL QL IA: 7.51 INDEX — HIGH
SARS-COV-2 IGG+IGM SERPL QL IA: >250 U/ML — HIGH
SARS-COV-2 IGG+IGM SERPL QL IA: POSITIVE
SARS-COV-2 IGG+IGM SERPL QL IA: POSITIVE
SODIUM SERPL-SCNC: 139 MMOL/L — SIGNIFICANT CHANGE UP (ref 135–146)
WBC # BLD: 8.24 K/UL — SIGNIFICANT CHANGE UP (ref 4.8–10.8)
WBC # FLD AUTO: 8.24 K/UL — SIGNIFICANT CHANGE UP (ref 4.8–10.8)

## 2021-04-26 PROCEDURE — 99233 SBSQ HOSP IP/OBS HIGH 50: CPT | Mod: CS

## 2021-04-26 RX ORDER — INSULIN GLARGINE 100 [IU]/ML
9 INJECTION, SOLUTION SUBCUTANEOUS AT BEDTIME
Refills: 0 | Status: DISCONTINUED | OUTPATIENT
Start: 2021-04-26 | End: 2021-04-27

## 2021-04-26 RX ORDER — INSULIN LISPRO 100/ML
3 VIAL (ML) SUBCUTANEOUS
Refills: 0 | Status: DISCONTINUED | OUTPATIENT
Start: 2021-04-26 | End: 2021-04-27

## 2021-04-26 RX ORDER — DEXTROSE 50 % IN WATER 50 %
15 SYRINGE (ML) INTRAVENOUS ONCE
Refills: 0 | Status: DISCONTINUED | OUTPATIENT
Start: 2021-04-26 | End: 2021-04-28

## 2021-04-26 RX ADMIN — LISINOPRIL 10 MILLIGRAM(S): 2.5 TABLET ORAL at 06:14

## 2021-04-26 RX ADMIN — Medication 3: at 11:42

## 2021-04-26 RX ADMIN — Medication 3 UNIT(S): at 11:39

## 2021-04-26 RX ADMIN — Medication 3 UNIT(S): at 17:21

## 2021-04-26 RX ADMIN — Medication 2: at 17:21

## 2021-04-26 RX ADMIN — Medication 1: at 07:53

## 2021-04-26 RX ADMIN — PANTOPRAZOLE SODIUM 40 MILLIGRAM(S): 20 TABLET, DELAYED RELEASE ORAL at 06:14

## 2021-04-26 RX ADMIN — Medication 6 MILLIGRAM(S): at 06:14

## 2021-04-26 RX ADMIN — SIMVASTATIN 40 MILLIGRAM(S): 20 TABLET, FILM COATED ORAL at 21:38

## 2021-04-26 RX ADMIN — INSULIN GLARGINE 9 UNIT(S): 100 INJECTION, SOLUTION SUBCUTANEOUS at 21:38

## 2021-04-26 RX ADMIN — AMLODIPINE BESYLATE 5 MILLIGRAM(S): 2.5 TABLET ORAL at 06:14

## 2021-04-26 NOTE — PROGRESS NOTE ADULT - ASSESSMENT
79 yo F with pmh of of DMII, HLD, HTN presents to the hospital with 3 weeks of generalized weakness, myalgias and non-productive cough now with shortness of breath, found to have covid19.    # Acute hypoxic respiratory failure secondary to COVID19  - On admission: On RA, SpO2 88%   - Now on 2L NC, sat 93-96%  - Baseline AB.46/36/74/25 (96%)  - CXR : Bilateral opacities consistent with atypical/Covid 19 pneumonia.  - CTA negative for PE ,+ b/l patchy groundglass opacities   - D-dimer 1555, Procal 0.08, CRP 94, , CPK 50, Trop <0.01, aPTT 25.6, PT 12.1, INR 1.05  > F/w decadrong 6mg qd  > No RDV/plasma (>72hrs since symptom onset)  > F/u repeat inflammatory markers  > Incentive spirometry  > F/u ID  > DVT ppx (lovenox 40 mg qd)    # Elevated D-dimer  - B/l LE duplex neg  - CTA negative for pe  > DVT ppx as above    # h/o HTN  > C/w amlodipine 5mg qd  > Hold for systolic      # h/o HLD   > c/w simvastatin 40 mg qd    # h/o DM (uncontrolled)  - hold home po medications  - Gluc: 200-300  > Basal/bolus insulin  - fingerstick ac, qhs  - sliding scale   - carb consistent diet  - f/u HbA1c    # DVT PPX:   # GI PPX: PPI  # Activity: as tolerated  # CHG BATH  # Diet: carb consistent    81 yo F with pmh of of DMII, HLD, HTN presents to the hospital with 3 weeks of generalized weakness, myalgias and non-productive cough now with shortness of breath, found to have covid19.    # Acute hypoxic respiratory failure secondary to COVID19  - On admission: On RA, SpO2 88%   - Now on 2L NC, sat 93-96%  - Baseline AB.46/36/74/25 (96%)  - CXR : Bilateral opacities consistent with atypical/Covid 19 pneumonia.  - CTA negative for PE ,+ b/l patchy groundglass opacities   - D-dimer 1555, Procal 0.08, CRP 94, Ferritin 589,   - CPK 50, Trop <0.01, aPTT 25.6, PT 12.1, INR 1.05  > F/w decadron 6mg qd  > No RDV/plasma (>72hrs since symptom onset)  > F/u repeat inflammatory markers  > Incentive spirometry  > F/u ID  > DVT ppx (lovenox 40 mg qd)    # Elevated D-dimer  - B/l LE duplex neg  - CTA negative for pe  > DVT ppx as above    # h/o HTN  > C/w amlodipine 5mg qd  > Hold for systolic      # h/o HLD   > c/w simvastatin 40 mg qd    # h/o DM (uncontrolled)  - Gluc: 200-300  > hold home po medications  > Start basal/bolus insulin: 9/3/3/3 + SS  > fingerstick ac, qhs  > carb consistent diet  > f/u HbA1c    # DVT PPX: lovenox 40mg SQ qd  # GI PPX: PPI  # Activity: as tolerated  # CHG BATH  # Diet: carb consistent

## 2021-04-26 NOTE — CONSULT NOTE ADULT - SUBJECTIVE AND OBJECTIVE BOX
CHEO LEAL  80y, Female  Allergy: No Known Allergies      CHIEF COMPLAINT: shortness of breath (26 Apr 2021 09:25)      LOS  2d    HPI:  81 yo F with pmh of of DMII, HLD, HTN presents to the hospital with 3 weeks of generalized weakness, myalgias and  cough productive of yellow sputum. The patient reports that her symptoms were stable until she developed dyspnea on the day of presentation to the hospital. Upon arrival to the hospital her SpO2 was 88% on room air and she was placed on 3 L n/s with improvement in saturation to 97% and subjective improvement in dyspnea, however she still feels weak and tired and has not been eating much in the last few days due to decreased appetite. She notes that at baseline she is fully functional and ambulates without assistance.  She denies any chills, fevers, abd pain, constipation, diarrhea, dysuria, wt loss, loss of taste/smell. all ros negative except as above.     T(C): 36.9 , HR: 68 , BP: 106/72 , RR: 20, SpO2: 97% on 3L N/C  Labs: (+) covid19 , d-dimer 1555  EKG: NSR     (24 Apr 2021 07:56)      INFECTIOUS DISEASE HISTORY:  History as above.   Currently on 2L NC.   Denies any shortness of breath and reports feeling well.     PAST MEDICAL & SURGICAL HISTORY:  Diabetes    Hypertension    Hyperlipidemia    No significant past surgical history        FAMILY HISTORY  Family Hx reviewed and non-contributory     SOCIAL HISTORY  Social History:  denies active smoking , alcohol abuse, or illicit drug use (24 Apr 2021 07:56)        ROS  General: Denies rigors, nightsweats  HEENT: Denies headache, rhinorrhea, sore throat, eye pain  CV: Denies CP, palpitations  PULM: Denies wheezing, hemoptysis  GI: Denies hematemesis, hematochezia, melena  : Denies discharge, hematuria  MSK: Denies arthralgias, myalgias  SKIN: Denies rash, lesions  NEURO: Denies paresthesias, weakness  PSYCH: Denies depression, anxiety    VITALS:  T(F): 96.1, Max: 97.6 (04-25-21 @ 20:54)  HR: 63  BP: 137/63  RR: 18Vital Signs Last 24 Hrs  T(C): 35.6 (26 Apr 2021 05:00), Max: 36.4 (25 Apr 2021 20:54)  T(F): 96.1 (26 Apr 2021 05:00), Max: 97.6 (25 Apr 2021 20:54)  HR: 63 (26 Apr 2021 05:00) (63 - 89)  BP: 137/63 (26 Apr 2021 05:00) (117/54 - 139/64)  BP(mean): --  RR: 18 (26 Apr 2021 08:15) (18 - 18)  SpO2: 96% (26 Apr 2021 08:15) (93% - 96%)    PHYSICAL EXAM:  Gen: NAD, resting in bed  HEENT: Normocephalic, atraumatic  Neck: supple, no lymphadenopathy  CV: Regular rate & regular rhythm  Lungs: decreased BS at bases, no fremitus  Abdomen: Soft, BS present  Ext: Warm, well perfused  Neuro: non focal, awake  Skin: no rash, no erythema  Lines: no phlebitis    TESTS & MEASUREMENTS:                        12.6   8.24  )-----------( 468      ( 26 Apr 2021 06:51 )             37.2     04-26    139  |  103  |  22<H>  ----------------------------<  162<H>  4.3   |  23  |  0.7    Ca    8.8      26 Apr 2021 06:51    TPro  6.2  /  Alb  3.4<L>  /  TBili  0.5  /  DBili  x   /  AST  26  /  ALT  19  /  AlkPhos  76  04-26    eGFR if Non African American: 82 mL/min/1.73M2 (04-26-21 @ 06:51)  eGFR if : 96 mL/min/1.73M2 (04-26-21 @ 06:51)    LIVER FUNCTIONS - ( 26 Apr 2021 06:51 )  Alb: 3.4 g/dL / Pro: 6.2 g/dL / ALK PHOS: 76 U/L / ALT: 19 U/L / AST: 26 U/L / GGT: x                     INFECTIOUS DISEASES TESTING  Procalcitonin, Serum: 0.08 ng/mL (04-23-21 @ 23:49)      RADIOLOGY & ADDITIONAL TESTS:  I have personally reviewed the last Chest xray  CXR      CT  CT Angio Chest PE Protocol w/ IV Cont:   EXAM:  CT ANGIO CHEST PE PROTOCOL IC            PROCEDURE DATE:  04/24/2021            INTERPRETATION:  CLINICAL HISTORY/REASON FOR EXAM: Cough.. Shortness of breath. Fever.    TECHNIQUE: Multislice helical sections were obtained from the thoracic inlet to the lung bases during rapid administration of intravenous contrast. Thin sections were reconstructed through the pulmonary vasculature. 3D (MIP) reformats obtained.    COMPARISON: CT chest February 5, 2009.    FINDINGS:    PULMONARY EMBOLUS: No evidence of acute pulmonary embolism.    LUNGS, PLEURA, AIRWAYS: Bilateral patchy predominantly groundglass airspace opacities. No pleural effusion or pneumothorax. Central airway patent. No pneumothorax.    THORACIC NODES: No mediastinal, hilar, supraclavicular, or axillary lymphadenopathy.    MEDIASTINUM/GREAT VESSELS: No pericardial effusion. Heart size is within normal limits. The aorta and main pulmonary artery are of normal caliber.    BONES/SOFT TISSUES: Unremarkable.    VISUALIZED UPPERABDOMEN: Unremarkable.      IMPRESSION:    1. Bilateral patchy predominantly groundglass airspace opacities, consistent with viral pneumonia in the appropriate clinical setting.    2. No evidence of acute pulmonary embolism.                  SURJIT ENCARNACION MD; Attending Radiologist  This document has been electronically signed. Apr 24 2021  4:19AM (04-24-21 @ 04:08)      CARDIOLOGY TESTING  12 Lead ECG:   Ventricular Rate 83 BPM    Atrial Rate 83 BPM    P-R Interval 140 ms    QRS Duration 62 ms    Q-T Interval 372 ms    QTC Calculation(Bazett) 437 ms    P Axis 84 degrees    R Axis 34 degrees    T Axis 39 degrees    Diagnosis Line Normal sinus rhythm  Normal ECG    Confirmed by FROY KUO MD [id number]) on 4/24/2021 6:25:26 AM (04-24-21 @ 00:16)      MEDICATIONS  amLODIPine   Tablet 5 Oral daily  chlorhexidine 4% Liquid 1 Topical <User Schedule>  dexAMETHasone  Injectable 6 IV Push daily  dextrose 40% Gel 15 Oral once  dextrose 40% Gel 15 Oral once  dextrose 5%. 1000 IV Continuous <Continuous>  dextrose 5%. 1000 IV Continuous <Continuous>  dextrose 50% Injectable 25 IV Push once  dextrose 50% Injectable 12.5 IV Push once  dextrose 50% Injectable 25 IV Push once  enoxaparin Injectable 40 SubCutaneous daily  glucagon  Injectable 1 IntraMuscular once  insulin glargine Injectable (LANTUS) 9 SubCutaneous at bedtime  insulin lispro (ADMELOG) corrective regimen sliding scale  SubCutaneous three times a day before meals  insulin lispro Injectable (ADMELOG) 3 SubCutaneous three times a day before meals  lisinopril 10 Oral daily  pantoprazole    Tablet 40 Oral before breakfast  simvastatin 40 Oral at bedtime      Weight  Weight (kg): 58.1 (04-23-21 @ 22:45)    ANTIBIOTICS:      ALLERGIES:  No Known Allergies

## 2021-04-26 NOTE — PROGRESS NOTE ADULT - ATTENDING COMMENTS
81 yo F with pmh of of DMII, HLD, HTN presents to the hospital with 3 weeks of generalized weakness, myalgias and non-productive cough now with shortness of breath, found to have covid19.      # Acute hypoxic respiratory failure secondary to COVID19  - On admission : SpO2 88% on room air > 3L n/c 97%  - CXR : b/l diffuse peripheral opacities ,  - CTA negative for PE , + b/l patchy groundglass opacities   - Get baseline procal, ferritin, ldh, cpk, trop, coags, crp ; can repeat q48h if clinically worsening  - baseline ABG reviewed.   - d-dimer 1555 , CTA as above , can repeat q48h if clinically worsening  Markers:   CRP 94 (4/23)   (4/25)  Ferritin pending  - incentive spirometry  - covid antibodies POS (4/25)     - decadron 6 mg qd   - not a candidate for remdesivir/ plasma given symptom onset 3 weeks ago  - f/u infectious disease consult  - DVT ppx per protocol    4/25: 2L NC. feels well. walking. c/w dexa. monitor markers. keep her walking  4/26: 2L NC. CST. continues to feel ok. walking. markers q48-72    # Elevated D-dimer  - CTA negative for pe  - b/l LE Duplex neg    # h/o HTN , c/w home medications, hold for systolic pressure of <100    # h/o HLD , c/w simvastatin    # h/o DM   - hold home po medications  4/26: lantus 9HS Lispro 3 TID AC    # DVT PPX:   # GI PPX: PPI  # Activity: as tolerated  # CHG BATH  # Diet: carb consistent .  Dispo: Still on O2

## 2021-04-26 NOTE — CONSULT NOTE ADULT - ASSESSMENT
ASSESSMENT  79 yo F with pmh of of DMII, HLD, HTN presents to the hospital with 3 weeks of generalized weakness, myalgias and  cough productive of yellow sputum    IMPRESSION  #COVID-19  - D-Dimer Assay, Quantitative: 1555: Manufacturers recommended Cut off for VTE is 230 ng/ml D-DU ng/mL DDU (04.23.21 @ 23:49)  - C-Reactive Protein, Serum: 94: Please note: Reference range has changed due to units of measure change. mg/L (04.23.21 @ 23:49)  - Procalcitonin, Serum: 0.08 (04.23.21 @ 23:49)  - CT Angio Chest PE Protocol w/ IV Cont (04.24.21 @ 04:08):  Bilateral patchy predominantly groundglass airspace opacities, consistent with viral pneumonia in the appropriate clinical setting.No evidence of acute pulmonary embolism.    #DM II  #HLD  #HTN    #Abx allergy: NKDA    RECOMMENDATIONS  - agree with holding RDV; plasma likely of little benefit  - continue dex 6 mg daily for 10 days or until discharged  - A/C per primary team  - repeat inflammatory markers if with worsening hypoxia  - no need for tocilizumab at this point; please call if with rapidly progressing hypoxia    Please call or message on [facility] Teams if with any questions.  Spectra [phone]

## 2021-04-27 LAB
A1C WITH ESTIMATED AVERAGE GLUCOSE RESULT: 7.9 % — HIGH (ref 4–5.6)
ALBUMIN SERPL ELPH-MCNC: 3.5 G/DL — SIGNIFICANT CHANGE UP (ref 3.5–5.2)
ALP SERPL-CCNC: 71 U/L — SIGNIFICANT CHANGE UP (ref 30–115)
ALT FLD-CCNC: 38 U/L — SIGNIFICANT CHANGE UP (ref 0–41)
ANION GAP SERPL CALC-SCNC: 10 MMOL/L — SIGNIFICANT CHANGE UP (ref 7–14)
AST SERPL-CCNC: 39 U/L — SIGNIFICANT CHANGE UP (ref 0–41)
AT III ACT/NOR PPP CHRO: 96 % — SIGNIFICANT CHANGE UP (ref 85–135)
BASOPHILS # BLD AUTO: 0.01 K/UL — SIGNIFICANT CHANGE UP (ref 0–0.2)
BASOPHILS NFR BLD AUTO: 0.1 % — SIGNIFICANT CHANGE UP (ref 0–1)
BILIRUB SERPL-MCNC: 0.4 MG/DL — SIGNIFICANT CHANGE UP (ref 0.2–1.2)
BUN SERPL-MCNC: 21 MG/DL — HIGH (ref 10–20)
CALCIUM SERPL-MCNC: 9 MG/DL — SIGNIFICANT CHANGE UP (ref 8.5–10.1)
CHLORIDE SERPL-SCNC: 102 MMOL/L — SIGNIFICANT CHANGE UP (ref 98–110)
CO2 SERPL-SCNC: 24 MMOL/L — SIGNIFICANT CHANGE UP (ref 17–32)
CREAT SERPL-MCNC: 0.8 MG/DL — SIGNIFICANT CHANGE UP (ref 0.7–1.5)
D DIMER BLD IA.RAPID-MCNC: 577 NG/ML DDU — HIGH (ref 0–230)
EOSINOPHIL # BLD AUTO: 0 K/UL — SIGNIFICANT CHANGE UP (ref 0–0.7)
EOSINOPHIL NFR BLD AUTO: 0 % — SIGNIFICANT CHANGE UP (ref 0–8)
ESTIMATED AVERAGE GLUCOSE: 180 MG/DL — HIGH (ref 68–114)
GLUCOSE BLDC GLUCOMTR-MCNC: 193 MG/DL — HIGH (ref 70–99)
GLUCOSE BLDC GLUCOMTR-MCNC: 202 MG/DL — HIGH (ref 70–99)
GLUCOSE BLDC GLUCOMTR-MCNC: 216 MG/DL — HIGH (ref 70–99)
GLUCOSE BLDC GLUCOMTR-MCNC: 256 MG/DL — HIGH (ref 70–99)
GLUCOSE SERPL-MCNC: 229 MG/DL — HIGH (ref 70–99)
HCT VFR BLD CALC: 38.2 % — SIGNIFICANT CHANGE UP (ref 37–47)
HGB BLD-MCNC: 13.1 G/DL — SIGNIFICANT CHANGE UP (ref 12–16)
IMM GRANULOCYTES NFR BLD AUTO: 1.1 % — HIGH (ref 0.1–0.3)
LYMPHOCYTES # BLD AUTO: 1.12 K/UL — LOW (ref 1.2–3.4)
LYMPHOCYTES # BLD AUTO: 11.6 % — LOW (ref 20.5–51.1)
MAGNESIUM SERPL-MCNC: 2 MG/DL — SIGNIFICANT CHANGE UP (ref 1.8–2.4)
MCHC RBC-ENTMCNC: 32.4 PG — HIGH (ref 27–31)
MCHC RBC-ENTMCNC: 34.3 G/DL — SIGNIFICANT CHANGE UP (ref 32–37)
MCV RBC AUTO: 94.6 FL — SIGNIFICANT CHANGE UP (ref 81–99)
MONOCYTES # BLD AUTO: 0.35 K/UL — SIGNIFICANT CHANGE UP (ref 0.1–0.6)
MONOCYTES NFR BLD AUTO: 3.6 % — SIGNIFICANT CHANGE UP (ref 1.7–9.3)
NEUTROPHILS # BLD AUTO: 8.06 K/UL — HIGH (ref 1.4–6.5)
NEUTROPHILS NFR BLD AUTO: 83.6 % — HIGH (ref 42.2–75.2)
NRBC # BLD: 0 /100 WBCS — SIGNIFICANT CHANGE UP (ref 0–0)
PLATELET # BLD AUTO: 504 K/UL — HIGH (ref 130–400)
POTASSIUM SERPL-MCNC: 4.7 MMOL/L — SIGNIFICANT CHANGE UP (ref 3.5–5)
POTASSIUM SERPL-SCNC: 4.7 MMOL/L — SIGNIFICANT CHANGE UP (ref 3.5–5)
PROT S FREE AG PPP IA-ACNC: 63 % — SIGNIFICANT CHANGE UP (ref 61–131)
PROT SERPL-MCNC: 6.5 G/DL — SIGNIFICANT CHANGE UP (ref 6–8)
RBC # BLD: 4.04 M/UL — LOW (ref 4.2–5.4)
RBC # FLD: 12.3 % — SIGNIFICANT CHANGE UP (ref 11.5–14.5)
SODIUM SERPL-SCNC: 136 MMOL/L — SIGNIFICANT CHANGE UP (ref 135–146)
WBC # BLD: 9.65 K/UL — SIGNIFICANT CHANGE UP (ref 4.8–10.8)
WBC # FLD AUTO: 9.65 K/UL — SIGNIFICANT CHANGE UP (ref 4.8–10.8)

## 2021-04-27 PROCEDURE — 99233 SBSQ HOSP IP/OBS HIGH 50: CPT | Mod: CS

## 2021-04-27 PROCEDURE — 93970 EXTREMITY STUDY: CPT | Mod: 26

## 2021-04-27 RX ORDER — INSULIN GLARGINE 100 [IU]/ML
11 INJECTION, SOLUTION SUBCUTANEOUS AT BEDTIME
Refills: 0 | Status: DISCONTINUED | OUTPATIENT
Start: 2021-04-27 | End: 2021-04-28

## 2021-04-27 RX ORDER — INSULIN LISPRO 100/ML
4 VIAL (ML) SUBCUTANEOUS
Refills: 0 | Status: DISCONTINUED | OUTPATIENT
Start: 2021-04-27 | End: 2021-04-28

## 2021-04-27 RX ADMIN — INSULIN GLARGINE 11 UNIT(S): 100 INJECTION, SOLUTION SUBCUTANEOUS at 21:07

## 2021-04-27 RX ADMIN — AMLODIPINE BESYLATE 5 MILLIGRAM(S): 2.5 TABLET ORAL at 05:01

## 2021-04-27 RX ADMIN — Medication 1: at 08:10

## 2021-04-27 RX ADMIN — SIMVASTATIN 40 MILLIGRAM(S): 20 TABLET, FILM COATED ORAL at 21:06

## 2021-04-27 RX ADMIN — CHLORHEXIDINE GLUCONATE 1 APPLICATION(S): 213 SOLUTION TOPICAL at 05:02

## 2021-04-27 RX ADMIN — Medication 6 MILLIGRAM(S): at 05:01

## 2021-04-27 RX ADMIN — Medication 4 UNIT(S): at 12:28

## 2021-04-27 RX ADMIN — PANTOPRAZOLE SODIUM 40 MILLIGRAM(S): 20 TABLET, DELAYED RELEASE ORAL at 05:01

## 2021-04-27 RX ADMIN — LISINOPRIL 10 MILLIGRAM(S): 2.5 TABLET ORAL at 05:02

## 2021-04-27 RX ADMIN — Medication 3: at 12:28

## 2021-04-27 RX ADMIN — Medication 4 UNIT(S): at 17:06

## 2021-04-27 RX ADMIN — Medication 2: at 17:06

## 2021-04-27 RX ADMIN — Medication 3 UNIT(S): at 08:10

## 2021-04-27 NOTE — PROGRESS NOTE ADULT - ATTENDING COMMENTS
81 yo F with pmh of of DMII, HLD, HTN presents to the hospital with 3 weeks of generalized weakness, myalgias and non-productive cough now with shortness of breath, found to have covid19.      # Acute hypoxic respiratory failure secondary to COVID19  - On admission : SpO2 88% on room air > 3L n/c 97%  - CXR : b/l diffuse peripheral opacities ,  - CTA negative for PE , + b/l patchy groundglass opacities   - Get baseline procal, ferritin, ldh, cpk, trop, coags, crp ; can repeat q48h if clinically worsening  - baseline ABG reviewed.   - d-dimer 1555 (CTA neg as above) >577  Markers:   CRP 94 (4/23) > 26 (4/25)   (4/25)  Ferritin 589 > 668 (4/25)  - incentive spirometry  - covid antibodies POS (4/25)     - decadron 6 mg qd   - not a candidate for remdesivir/ plasma given symptom onset 3 weeks ago  - f/u infectious disease consult  - DVT ppx per protocol    4/25: 2L NC. feels well. walking. c/w dexa. monitor markers. keep her walking  4/26: 2L NC. CST. continues to feel ok. walking. markers q48-72  4/27: RA walking. Anticipate for d/c tomorrow. repeat Duplex since patient on lovenox trial.     # Elevated D-dimer  - CTA negative for pe  - b/l LE Duplex neg    # h/o HTN , c/w home medications, hold for systolic pressure of <100    # h/o HLD , c/w simvastatin    # h/o DM   - hold home po medications  4/26: lantus 9HS Lispro 3 TID AC  A1C 7.9. upon discharge will need medications    # DVT PPX:   # GI PPX: PPI  # Activity: as tolerated  # CHG BATH  # Diet: carb consistent .  Dispo: anticipate for tomorrow home on RA

## 2021-04-27 NOTE — DISCHARGE NOTE NURSING/CASE MANAGEMENT/SOCIAL WORK - PATIENT PORTAL LINK FT
You can access the FollowMyHealth Patient Portal offered by [facility] by registering at the following website: [email or web address]. By joining [employer]’s FollowMyHealth portal, you will also be able to view your health information using other applications (apps) compatible with our system.

## 2021-04-27 NOTE — PROGRESS NOTE ADULT - ASSESSMENT
79 yo F with pmh of of DMII, HLD, HTN presents to the hospital with 3 weeks of generalized weakness, myalgias and non-productive cough now with shortness of breath, found to have covid19.    # Acute hypoxic respiratory failure secondary to COVID19  - On admission: On RA, SpO2 88%   - Now on 2L NC, sat 96%  - Baseline ABG 4/24: 7.46/36/74/25 (96%)  - CXR 4/24: Bilateral opacities consistent with atypical/Covid 19 pneumonia.  - CTA negative for PE ,+ b/l patchy groundglass opacities   - D-dimer 1555, Procal 0.08 > .06, CRP 94>26, Ferritin 589>668, >403  - CPK 50, Trop <0.01, aPTT 25.6, PT 12.1, INR 1.05  > F/w decadron 6mg qd  > No RDV/plasma (>72hrs since symptom onset)  > Incentive spirometry  > DVT ppx (lovenox 40 mg qd)    # Elevated D-dimer  - B/l LE duplex neg  - CTA negative for pe  > DVT ppx as above    # h/o HTN  > C/w amlodipine 5mg qd  > Hold for systolic      # h/o HLD   > c/w simvastatin 40 mg qd    # h/o DM (uncontrolled)  - Gluc: >200  - a1c: 8.0%  > hold home po medications  > Increase basal/bolus insulin: 9/3/3/3 + SS  > fingerstick ac, qhs  > carb consistent diet    # DVT PPX: lovenox 40mg SQ qd  # GI PPX: PPI  # Activity: as tolerated  # CHG BATH  # Diet: carb consistent      81 yo F with pmh of of DMII, HLD, HTN presents to the hospital with 3 weeks of generalized weakness, myalgias and non-productive cough now with shortness of breath, found to have covid19.    # Acute hypoxic respiratory failure secondary to COVID19  - On admission: On RA, SpO2 88%   - Now on 2L NC, sat 96%  - Baseline ABG 4/24: 7.46/36/74/25 (96%)  - CXR 4/24: Bilateral opacities consistent with atypical/Covid 19 pneumonia.  - CTA negative for PE ,+ b/l patchy groundglass opacities   - D-dimer 1555, Procal 0.08 > .06, CRP 94>26, Ferritin 589>668, >403  - CPK 50, Trop <0.01, aPTT 25.6, PT 12.1, INR 1.05  > F/w decadron 6mg qd  > No RDV/plasma (>72hrs since symptom onset)  > Incentive spirometry  > DVT ppx (lovenox 40 mg qd)    # Elevated D-dimer  - B/l LE duplex neg  - CTA negative for pe  > DVT ppx as above    # h/o HTN  > C/w amlodipine 5mg qd  > Hold for systolic      # h/o HLD   > c/w simvastatin 40 mg qd    # h/o DM (uncontrolled)  - Gluc: >200  - a1c: 8.0%  > hold home po medications  > Increase basal/bolus insulin: 10/5/5/5 + SS  > fingerstick ac, qhs  > carb consistent diet    # DVT PPX: lovenox 40mg SQ qd  # GI PPX: PPI  # Activity: as tolerated  # CHG BATH  # Diet: carb consistent   # Pending: wean O2, likely d/c in ~48hrs

## 2021-04-28 VITALS — RESPIRATION RATE: 18 BRPM | OXYGEN SATURATION: 93 %

## 2021-04-28 LAB
ALBUMIN SERPL ELPH-MCNC: 3.5 G/DL — SIGNIFICANT CHANGE UP (ref 3.5–5.2)
ALP SERPL-CCNC: 70 U/L — SIGNIFICANT CHANGE UP (ref 30–115)
ALT FLD-CCNC: 46 U/L — HIGH (ref 0–41)
ANION GAP SERPL CALC-SCNC: 13 MMOL/L — SIGNIFICANT CHANGE UP (ref 7–14)
AST SERPL-CCNC: 35 U/L — SIGNIFICANT CHANGE UP (ref 0–41)
BASOPHILS # BLD AUTO: 0.03 K/UL — SIGNIFICANT CHANGE UP (ref 0–0.2)
BASOPHILS NFR BLD AUTO: 0.3 % — SIGNIFICANT CHANGE UP (ref 0–1)
BILIRUB SERPL-MCNC: 0.4 MG/DL — SIGNIFICANT CHANGE UP (ref 0.2–1.2)
BUN SERPL-MCNC: 26 MG/DL — HIGH (ref 10–20)
CALCIUM SERPL-MCNC: 8.8 MG/DL — SIGNIFICANT CHANGE UP (ref 8.5–10.1)
CHLORIDE SERPL-SCNC: 102 MMOL/L — SIGNIFICANT CHANGE UP (ref 98–110)
CO2 SERPL-SCNC: 23 MMOL/L — SIGNIFICANT CHANGE UP (ref 17–32)
CREAT SERPL-MCNC: 0.6 MG/DL — LOW (ref 0.7–1.5)
CRP SERPL-MCNC: 8 MG/L — HIGH
D DIMER BLD IA.RAPID-MCNC: 598 NG/ML DDU — HIGH (ref 0–230)
EOSINOPHIL # BLD AUTO: 0.01 K/UL — SIGNIFICANT CHANGE UP (ref 0–0.7)
EOSINOPHIL NFR BLD AUTO: 0.1 % — SIGNIFICANT CHANGE UP (ref 0–8)
FIBRINOGEN PPP-MCNC: 529 MG/DL — SIGNIFICANT CHANGE UP (ref 204.4–570.6)
GLUCOSE BLDC GLUCOMTR-MCNC: 195 MG/DL — HIGH (ref 70–99)
GLUCOSE BLDC GLUCOMTR-MCNC: 335 MG/DL — HIGH (ref 70–99)
GLUCOSE SERPL-MCNC: 185 MG/DL — HIGH (ref 70–99)
HCT VFR BLD CALC: 37.4 % — SIGNIFICANT CHANGE UP (ref 37–47)
HGB BLD-MCNC: 12.8 G/DL — SIGNIFICANT CHANGE UP (ref 12–16)
IMM GRANULOCYTES NFR BLD AUTO: 1.5 % — HIGH (ref 0.1–0.3)
INR BLD: 0.99 RATIO — SIGNIFICANT CHANGE UP (ref 0.65–1.3)
LYMPHOCYTES # BLD AUTO: 1.6 K/UL — SIGNIFICANT CHANGE UP (ref 1.2–3.4)
LYMPHOCYTES # BLD AUTO: 16.5 % — LOW (ref 20.5–51.1)
MCHC RBC-ENTMCNC: 32.4 PG — HIGH (ref 27–31)
MCHC RBC-ENTMCNC: 34.2 G/DL — SIGNIFICANT CHANGE UP (ref 32–37)
MCV RBC AUTO: 94.7 FL — SIGNIFICANT CHANGE UP (ref 81–99)
MONOCYTES # BLD AUTO: 0.59 K/UL — SIGNIFICANT CHANGE UP (ref 0.1–0.6)
MONOCYTES NFR BLD AUTO: 6.1 % — SIGNIFICANT CHANGE UP (ref 1.7–9.3)
NEUTROPHILS # BLD AUTO: 7.33 K/UL — HIGH (ref 1.4–6.5)
NEUTROPHILS NFR BLD AUTO: 75.5 % — HIGH (ref 42.2–75.2)
NRBC # BLD: 0 /100 WBCS — SIGNIFICANT CHANGE UP (ref 0–0)
PLATELET # BLD AUTO: 480 K/UL — HIGH (ref 130–400)
POTASSIUM SERPL-MCNC: 4.3 MMOL/L — SIGNIFICANT CHANGE UP (ref 3.5–5)
POTASSIUM SERPL-SCNC: 4.3 MMOL/L — SIGNIFICANT CHANGE UP (ref 3.5–5)
PROT C ACT/NOR PPP: 144 % — HIGH (ref 65–129)
PROT S FREE PPP-ACNC: 70 % — SIGNIFICANT CHANGE UP (ref 63–140)
PROT SERPL-MCNC: 6 G/DL — SIGNIFICANT CHANGE UP (ref 6–8)
PROTHROM AB SERPL-ACNC: 11.4 SEC — SIGNIFICANT CHANGE UP (ref 9.95–12.87)
RBC # BLD: 3.95 M/UL — LOW (ref 4.2–5.4)
RBC # FLD: 12.3 % — SIGNIFICANT CHANGE UP (ref 11.5–14.5)
SODIUM SERPL-SCNC: 138 MMOL/L — SIGNIFICANT CHANGE UP (ref 135–146)
TROPONIN T SERPL-MCNC: <0.01 NG/ML — SIGNIFICANT CHANGE UP
WBC # BLD: 9.71 K/UL — SIGNIFICANT CHANGE UP (ref 4.8–10.8)
WBC # FLD AUTO: 9.71 K/UL — SIGNIFICANT CHANGE UP (ref 4.8–10.8)

## 2021-04-28 PROCEDURE — 99239 HOSP IP/OBS DSCHRG MGMT >30: CPT | Mod: CS

## 2021-04-28 RX ADMIN — AMLODIPINE BESYLATE 5 MILLIGRAM(S): 2.5 TABLET ORAL at 05:06

## 2021-04-28 RX ADMIN — Medication 4: at 12:06

## 2021-04-28 RX ADMIN — Medication 4 UNIT(S): at 12:07

## 2021-04-28 RX ADMIN — PANTOPRAZOLE SODIUM 40 MILLIGRAM(S): 20 TABLET, DELAYED RELEASE ORAL at 05:06

## 2021-04-28 RX ADMIN — Medication 6 MILLIGRAM(S): at 05:07

## 2021-04-28 RX ADMIN — Medication 1: at 08:25

## 2021-04-28 RX ADMIN — LISINOPRIL 10 MILLIGRAM(S): 2.5 TABLET ORAL at 05:07

## 2021-04-28 RX ADMIN — Medication 4 UNIT(S): at 08:25

## 2021-04-28 RX ADMIN — CHLORHEXIDINE GLUCONATE 1 APPLICATION(S): 213 SOLUTION TOPICAL at 05:07

## 2021-04-28 NOTE — PHYSICAL THERAPY INITIAL EVALUATION ADULT - ADDITIONAL COMMENTS
Pt. reports she was fully independent PTA and did not require an AD. Pt. reports she lives with her family in a private house with 4 MT.

## 2021-04-28 NOTE — PHYSICAL THERAPY INITIAL EVALUATION ADULT - GENERAL OBSERVATIONS, REHAB EVAL
Pt. encountered alert and NAD, supine in bed. (+)on RA, (+)alarms, agreeable to PT Eval. Wolof  used #[id number]. Pt. left as found, supine in bed (+)alarms (+)call bell in reach, NAD

## 2021-04-28 NOTE — PROGRESS NOTE ADULT - TIME BILLING
complete patient's bedside assessment, review medical chart, discuss  discharge medical plan of care with covering medical team

## 2021-04-28 NOTE — DISCHARGE NOTE PROVIDER - HOSPITAL COURSE
79 yo F with pmh of of DMII, HLD, HTN presents to the hospital with 3 weeks of generalized weakness, myalgias and  cough productive of yellow sputum. The patient reports that her symptoms were stable until she developed dyspnea on the day of presentation to the hospital. Upon arrival to the hospital her SpO2 was 88% on room air and she was placed on 3 L n/s with improvement in saturation to 97% and subjective improvement in dyspnea, She notes that at baseline she is fully functional and ambulates without assistance.  She denies any chills, fevers, abd pain, constipation, diarrhea, dysuria, wt loss, loss of taste/smell. all ros negative except as above.     Put on 3L n/c with sat 97%. Baseline ABG 4/24: 7.46/36/74/25 (96%). CXR : b/l diffuse peripheral opacities, +b/l patchy groundglass opacities. CTA negative for PE , + b/l patchy groundglass opacities. d-dimer 1555, Procal 0.08, CRP 94, Ferritin 589, . CPK 50, Trop <0.01, aPTT 25.6, PT 12.1, INR 1.05. Started on decadron 6 mg qd. Not a candidate for remdesivir/ plasma given symptom onset 3 weeks ago. Started on DVT ppx.   On 4/27 she was switched to RA satting >92%. On 4/28 she walked 60ft on RA without desaturating.    She is medically safe for discharge today.  She will continue prednisone taper at home (2 days) and follow up with primary doctor for post-covid care.

## 2021-04-28 NOTE — PROGRESS NOTE ADULT - SUBJECTIVE AND OBJECTIVE BOX
Today is hospital day 2d.     INTERVAL HPI / OVERNIGHT EVENTS  Patient was examined and seen at bedside.   No OVNE.       PAST MEDICAL & SURGICAL HISTORY  Diabetes    Hypertension    Hyperlipidemia    No significant past surgical history      ALLERGIES  No Known Allergies    MEDICATIONS  STANDING MEDICATIONS  amLODIPine   Tablet 5 milliGRAM(s) Oral daily  chlorhexidine 4% Liquid 1 Application(s) Topical <User Schedule>  dexAMETHasone  Injectable 6 milliGRAM(s) IV Push daily  dextrose 40% Gel 15 Gram(s) Oral once  dextrose 40% Gel 15 Gram(s) Oral once  dextrose 5%. 1000 milliLiter(s) IV Continuous <Continuous>  dextrose 5%. 1000 milliLiter(s) IV Continuous <Continuous>  dextrose 50% Injectable 25 Gram(s) IV Push once  dextrose 50% Injectable 12.5 Gram(s) IV Push once  dextrose 50% Injectable 25 Gram(s) IV Push once  enoxaparin Injectable 40 milliGRAM(s) SubCutaneous daily  glucagon  Injectable 1 milliGRAM(s) IntraMuscular once  insulin glargine Injectable (LANTUS) 9 Unit(s) SubCutaneous at bedtime  insulin lispro (ADMELOG) corrective regimen sliding scale   SubCutaneous three times a day before meals  insulin lispro Injectable (ADMELOG) 3 Unit(s) SubCutaneous three times a day before meals  lisinopril 10 milliGRAM(s) Oral daily  pantoprazole    Tablet 40 milliGRAM(s) Oral before breakfast  simvastatin 40 milliGRAM(s) Oral at bedtime    PRN MEDICATIONS  benzonatate 100 milliGRAM(s) Oral three times a day PRN    VITALS:  T(F): 96.1  HR: 63  BP: 137/63  RR: 18  SpO2: 96%    PHYSICAL EXAM  GEN: NAD, Resting comfortably in bed  PULM: Clear to auscultation bilaterally, No wheezes/rales/rhonchi  CVS: Regular rate and rhythm, S1-S2, no murmurs/rubs/gallops, +2 pulses  ABD: Soft, non-tender, non-distended, no guarding  EXT: No edema  NEURO: awake/alert, no focal deficits    LABS                        13.2   6.47  )-----------( 453      ( 25 Apr 2021 07:25 )             38.2     04-25    141  |  105  |  19  ----------------------------<  175<H>  4.5   |  24  |  0.6<L>    Ca    8.7      25 Apr 2021 07:25    TPro  6.4  /  Alb  3.4<L>  /  TBili  0.5  /  DBili  x   /  AST  23  /  ALT  17  /  AlkPhos  78  04-25    PT/INR - ( 25 Apr 2021 07:25 )   PT: 12.10 sec;   INR: 1.05 ratio         PTT - ( 25 Apr 2021 07:25 )  PTT:25.6 sec    ABG - ( 24 Apr 2021 09:47 )  pH, Arterial: 7.46  pH, Blood: x     /  pCO2: 36    /  pO2: 74    / HCO3: 25    / Base Excess: 1.8   /  SaO2: 96            CARDIAC MARKERS ( 25 Apr 2021 07:25 )  x     / <0.01 ng/mL / 50 U/L / x     / x          RADIOLOGY  < from: [facility] Duplex Lower Ext Vein Scan, Bilat (04.24.21 @ 13:28) >  No evidence of deep venous thrombosis or superficial thrombophlebitis in the bilateral lower extremities.  < end of copied text >    < from: CT Angio Chest PE Protocol w/ IV Cont (04.24.21 @ 04:08) >  1. Bilateral patchy predominantly groundglass airspace opacities, consistent with viral pneumonia in the appropriate clinical setting.  2. No evidence of acute pulmonary embolism.  < end of copied text >  
  [facility]            Patient was evaluated and examined  by bedside, no active complains, no fever, no hypoxia                REVIEW OF SYSTEMS:  please see pertinent positives mentioned above, all other 12 ROS negative      T(C): , Max: 36.4 (04-28-21 @ 05:00)  HR: 67 (04-28-21 @ 05:00)  BP: 112/67 (04-28-21 @ 05:00)  RR: 18 (04-28-21 @ 06:35)  SpO2: 93% (04-28-21 @ 06:35)  CAPILLARY BLOOD GLUCOSE      POCT Blood Glucose.: 195 mg/dL (28 Apr 2021 08:07)  POCT Blood Glucose.: 202 mg/dL (27 Apr 2021 20:55)  POCT Blood Glucose.: 216 mg/dL (27 Apr 2021 16:35)  POCT Blood Glucose.: 256 mg/dL (27 Apr 2021 12:11)      PHYSICAL EXAM:  General: NAD, AAOX3, patient is laying comfortably in bed  HEENT: AT, NC, Supple, NO JVD, NO CB  Lungs: CTA B/L, no wheezing, no rhonchi  CVS: normal S1, S2, RRR, NO M/G/R  Abdomen: soft, bowel sounds present, non-tender, non-distended  Extremities: no edema, no clubbing, no cyanosis, positive peripheral pulses b/l  Neuro: no acute focal neurological deficits  Skin: no rash, no ecchymosis      LAB  CBC  Date: 04-28-21 @ 06:51  Mean cell Wbkccnwlyk42.4  Mean cell Hemoglobin Conc34.2  Mean cell Volum 94.7  Platelet count-Automate 480  RBC Count 3.95  Red Cell Distrib Width12.3  WBC Count9.71  % Albumin, Urine--  Hematocrit 37.4  Hemoglobin 12.8  CBC  Date: 04-27-21 @ 08:10  Mean cell Emxhlcowjr57.4  Mean cell Hemoglobin Conc34.3  Mean cell Volum 94.6  Platelet count-Automate 504  RBC Count 4.04  Red Cell Distrib Width12.3  WBC Count9.65  % Albumin, Urine--  Hematocrit 38.2  Hemoglobin 13.1  CBC  Date: 04-26-21 @ 06:51  Mean cell Abiaxzsktw18.5  Mean cell Hemoglobin Conc33.9  Mean cell Volum 95.9  Platelet count-Automate 468  RBC Count 3.88  Red Cell Distrib Width12.4  WBC Count8.24  % Albumin, Urine--  Hematocrit 37.2  Hemoglobin 12.6  CBC  Date: 04-25-21 @ 07:25  Mean cell Eorduirfvo53.8  Mean cell Hemoglobin Conc34.6  Mean cell Volum 95.0  Platelet count-Automate 453  RBC Count 4.02  Red Cell Distrib Width12.5  WBC Count6.47  % Albumin, Urine--  Hematocrit 38.2  Hemoglobin 13.2  CBC  Date: 04-24-21 @ 12:50  Mean cell Dohjmnrpxt82.4  Mean cell Hemoglobin Conc34.0  Mean cell Volum 95.3  Platelet count-Automate 414  RBC Count 4.04  Red Cell Distrib Width12.7  WBC Count5.62  % Albumin, Urine--  Hematocrit 38.5  Hemoglobin 13.1  CBC  Date: 04-23-21 @ 23:49  Mean cell Fhhdjosbda45.5  Mean cell Hemoglobin Conc33.8  Mean cell Volum 96.1  Platelet count-Automate 411  RBC Count 4.09  Red Cell Distrib Width12.8  WBC Count7.91  % Albumin, Urine--  Hematocrit 39.3  Hemoglobin 13.3    [facility]  04-28-21 @ 06:51  Blood Gas Arterial-Calcium,Ionized--  Blood Urea Nitrogen, Serum 26 mg/dL<H> [10 - 20]  Carbon Dioxide, Serum23 mmol/L [17 - 32]  Chloride, Ovyba277 mmol/L [98 - 110]  Creatinie, Serum0.6 mg/dL<L> [0.7 - 1.5]  Glucose, Geihl356 mg/dL<H> [70 - 99]  Potassium, Serum4.3 mmol/L [3.5 - 5.0]  Sodium, Serum 138 mmol/L [135 - 146]  [facility]  04-27-21 @ 08:10  Blood Gas Arterial-Calcium,Ionized--  Blood Urea Nitrogen, Serum 21 mg/dL<H> [10 - 20]  Carbon Dioxide, Serum24 mmol/L [17 - 32]  Chloride, Byewy015 mmol/L [98 - 110]  Creatinie, Serum0.8 mg/dL [0.7 - 1.5]  Glucose, Brpib363 mg/dL<H> [70 - 99]  Potassium, Serum4.7 mmol/L [3.5 - 5.0]  Sodium, Serum 136 mmol/L [135 - 146]  [facility]  04-26-21 @ 06:51  Blood Gas Arterial-Calcium,Ionized--  Blood Urea Nitrogen, Serum 22 mg/dL<H> [10 - 20]  Carbon Dioxide, Serum23 mmol/L [17 - 32]  Chloride, Pokrl782 mmol/L [98 - 110]  Creatinie, Serum0.7 mg/dL [0.7 - 1.5]  Glucose, Atqjc466 mg/dL<H> [70 - 99]  Potassium, Serum4.3 mmol/L [3.5 - 5.0]  Sodium, Serum 139 mmol/L [135 - 146]  [facility]  04-25-21 @ 07:25  Blood Gas Arterial-Calcium,Ionized--  Blood Urea Nitrogen, Serum 19 mg/dL [10 - 20]  Carbon Dioxide, Serum24 mmol/L [17 - 32]  Chloride, Yxcff524 mmol/L [98 - 110]  Creatinie, Serum0.6 mg/dL<L> [0.7 - 1.5]  Glucose, Pyvrk728 mg/dL<H> [70 - 99]  Potassium, Serum4.5 mmol/L [3.5 - 5.0]  Sodium, Serum 141 mmol/L [135 - 146]  [facility]  04-24-21 @ 12:50  Blood Gas Arterial-Calcium,Ionized--  Blood Urea Nitrogen, Serum 17 mg/dL [10 - 20]  Carbon Dioxide, Serum20 mmol/L [17 - 32]  Chloride, Dqopz977 mmol/L [98 - 110]  Creatinie, Serum0.6 mg/dL<L> [0.7 - 1.5]  Glucose, Zbutf663 mg/dL<H> [70 - 99]  Potassium, Serum4.0 mmol/L [3.5 - 5.0]  Sodium, Serum 138 mmol/L [135 - 146]  [facility]  04-23-21 @ 23:49  Blood Gas Arterial-Calcium,Ionized--  Blood Urea Nitrogen, Serum 19 mg/dL [10 - 20]  Carbon Dioxide, Serum21 mmol/L [17 - 32]  Chloride, Serum99 mmol/L [98 - 110]  Creatinie, Serum0.8 mg/dL [0.7 - 1.5]  Glucose, Dcegk273 mg/dL<H> [70 - 99]  Potassium, Serum4.1 mmol/L [3.5 - 5.0]  Sodium, Serum 135 mmol/L [135 - 146]        PT/INR - ( 28 Apr 2021 06:51 )   PT: 11.40 sec;   INR: 0.99 ratio             Medications:  amLODIPine   Tablet 5 milliGRAM(s) Oral daily  benzonatate 100 milliGRAM(s) Oral three times a day PRN  chlorhexidine 4% Liquid 1 Application(s) Topical <User Schedule>  dexAMETHasone  Injectable 6 milliGRAM(s) IV Push daily  dextrose 40% Gel 15 Gram(s) Oral once  dextrose 40% Gel 15 Gram(s) Oral once  dextrose 5%. 1000 milliLiter(s) IV Continuous <Continuous>  dextrose 5%. 1000 milliLiter(s) IV Continuous <Continuous>  dextrose 50% Injectable 25 Gram(s) IV Push once  dextrose 50% Injectable 12.5 Gram(s) IV Push once  dextrose 50% Injectable 25 Gram(s) IV Push once  enoxaparin Study Injectable () 1 Dose(s) SubCutaneous daily  glucagon  Injectable 1 milliGRAM(s) IntraMuscular once  insulin glargine Injectable (LANTUS) 11 Unit(s) SubCutaneous at bedtime  insulin lispro (ADMELOG) corrective regimen sliding scale   SubCutaneous three times a day before meals  insulin lispro Injectable (ADMELOG) 4 Unit(s) SubCutaneous three times a day before meals  lisinopril 10 milliGRAM(s) Oral daily  pantoprazole    Tablet 40 milliGRAM(s) Oral before breakfast  simvastatin 40 milliGRAM(s) Oral at bedtime        Assessment and Plan:  Patient is a 79 y/o Female with pmh of of DMII, HLD, HTN presents to the hospital with 3 weeks of generalized weakness, myalgias and non-productive cough now with shortness of breath, found to have covid19.      # Acute hypoxic respiratory failure secondary to COVID19  - On admission : SpO2 88% on room air > 3L n/c 97%  - CXR : b/l diffuse peripheral opacities ,  - CTA negative for PE , + b/l patchy groundglass opacities   - inflammatory markers trending down  - d-dimer 1555 (CTA neg as above) >577, lower ext. doppler neg. for DVT  -covid ab's neg  - during inpatient stay patient was  tx. with decadron 6 mg qd - upon d/c home switched to po prednisone 40 mg po once daily x 3 days, followed by prednisone 20 mg po once daily x 2 days.    4/28- d/c home planning , off oxygen tx. sat 93%    # Elevated D-dimer- levels trending down  - CTA negative for pe  - b/l LE Duplex neg    # h/o HTN , c/w home medications    # h/o HLD , c/w simvastatin    # h/o DM   - during inpatient stay patient was tx. with basal/bolus insulin tx, upon d/c home resumed on home meds  hg A1C 7.9    #Progress Note Handoff: Patient was medically optimized, stable for d/c home today, f/up PT eval prior to d/c today.  Family discussion: Patient verbalized good understanding of discharge instructions and agreed with discharge plan.  Disposition: Home___today     
S: No new events/complaints  walking  eating, drinking      All other pertinent ROS negative.      Vital Signs Last 24 Hrs  T(C): 35.9 (25 Apr 2021 13:10), Max: 36.3 (24 Apr 2021 21:12)  T(F): 96.7 (25 Apr 2021 13:10), Max: 97.4 (24 Apr 2021 21:12)  HR: 89 (25 Apr 2021 13:10) (66 - 89)  BP: 139/64 (25 Apr 2021 13:10) (126/58 - 139/64)  BP(mean): --  RR: 18 (25 Apr 2021 13:10) (17 - 18)  SpO2: 95% (25 Apr 2021 13:10) (95% - 96%)  PHYSICAL EXAM:    Constitutional: NAD, awake and alert, well-developed  HEENT: PERR, EOMI, Normal Hearing, MMM  Neck: Soft and supple, No LAD, No JVD  Respiratory: Breath sounds are clear bilaterally, No wheezing, occ rales  Cardiovascular: S1 and S2, regular rate and rhythm, no Murmurs, gallops or rubs  Gastrointestinal: Bowel Sounds present, soft, nontender, nondistended, no guarding, no rebound  Extremities: No peripheral edema      MEDICATIONS:  MEDICATIONS  (STANDING):  amLODIPine   Tablet 5 milliGRAM(s) Oral daily  chlorhexidine 4% Liquid 1 Application(s) Topical <User Schedule>  dexAMETHasone  Injectable 6 milliGRAM(s) IV Push daily  dextrose 40% Gel 15 Gram(s) Oral once  dextrose 5%. 1000 milliLiter(s) (50 mL/Hr) IV Continuous <Continuous>  dextrose 5%. 1000 milliLiter(s) (100 mL/Hr) IV Continuous <Continuous>  dextrose 50% Injectable 25 Gram(s) IV Push once  dextrose 50% Injectable 12.5 Gram(s) IV Push once  dextrose 50% Injectable 25 Gram(s) IV Push once  enoxaparin Injectable 40 milliGRAM(s) SubCutaneous daily  glucagon  Injectable 1 milliGRAM(s) IntraMuscular once  insulin lispro (ADMELOG) corrective regimen sliding scale   SubCutaneous three times a day before meals  lisinopril 10 milliGRAM(s) Oral daily  pantoprazole    Tablet 40 milliGRAM(s) Oral before breakfast  simvastatin 40 milliGRAM(s) Oral at bedtime      LABS: All Labs Reviewed:                        13.2   6.47  )-----------( 453      ( 25 Apr 2021 07:25 )             38.2     04-25    141  |  105  |  19  ----------------------------<  175<H>  4.5   |  24  |  0.6<L>    Ca    8.7      25 Apr 2021 07:25    TPro  6.4  /  Alb  3.4<L>  /  TBili  0.5  /  DBili  x   /  AST  23  /  ALT  17  /  AlkPhos  78  04-25    PT/INR - ( 25 Apr 2021 07:25 )   PT: 12.10 sec;   INR: 1.05 ratio         PTT - ( 25 Apr 2021 07:25 )  PTT:25.6 sec  CARDIAC MARKERS ( 25 Apr 2021 07:25 )  x     / <0.01 ng/mL / 50 U/L / x     / x          Blood Culture:     Radiology: reviewed    
Today is hospital day 3d.     INTERVAL HPI / OVERNIGHT EVENTS  Patient was examined and seen at bedside.   No OVNE.       PAST MEDICAL & SURGICAL HISTORY  Diabetes  Hypertension  Hyperlipidemia  No significant past surgical history    ALLERGIES  No Known Allergies    MEDICATIONS  STANDING MEDICATIONS  amLODIPine   Tablet 5 milliGRAM(s) Oral daily  chlorhexidine 4% Liquid 1 Application(s) Topical <User Schedule>  dexAMETHasone  Injectable 6 milliGRAM(s) IV Push daily  dextrose 40% Gel 15 Gram(s) Oral once  dextrose 40% Gel 15 Gram(s) Oral once  dextrose 5%. 1000 milliLiter(s) IV Continuous <Continuous>  dextrose 5%. 1000 milliLiter(s) IV Continuous <Continuous>  dextrose 50% Injectable 25 Gram(s) IV Push once  dextrose 50% Injectable 12.5 Gram(s) IV Push once  dextrose 50% Injectable 25 Gram(s) IV Push once  enoxaparin Study Injectable () 1 Dose(s) SubCutaneous daily  glucagon  Injectable 1 milliGRAM(s) IntraMuscular once  insulin glargine Injectable (LANTUS) 11 Unit(s) SubCutaneous at bedtime  insulin lispro (ADMELOG) corrective regimen sliding scale   SubCutaneous three times a day before meals  insulin lispro Injectable (ADMELOG) 4 Unit(s) SubCutaneous three times a day before meals  lisinopril 10 milliGRAM(s) Oral daily  pantoprazole    Tablet 40 milliGRAM(s) Oral before breakfast  simvastatin 40 milliGRAM(s) Oral at bedtime    PRN MEDICATIONS  benzonatate 100 milliGRAM(s) Oral three times a day PRN    VITALS:  T(F): 96.1  HR: 56  BP: 139/70  RR: 18  SpO2: 95%    PHYSICAL EXAM  GEN: NAD, Resting comfortably in bed  PULM: Crackles at b/l bases  CVS: Regular rate and rhythm, S1-S2, no murmurs/rubs/gallops, +2 pulses  ABD: Soft, non-tender, non-distended, no guarding  EXT: No edema  NEURO: A&Ox3, no focal deficits    LABS                        13.1   9.65  )-----------( 504      ( 27 Apr 2021 08:10 )             38.2     04-27    136  |  102  |  21<H>  ----------------------------<  229<H>  4.7   |  24  |  0.8    Ca    9.0      27 Apr 2021 08:10  Mg     2.0     04-27    TPro  6.5  /  Alb  3.5  /  TBili  0.4  /  DBili  x   /  AST  39  /  ALT  38  /  AlkPhos  71  04-27    PT/INR - ( 26 Apr 2021 18:41 )   PT: 11.80 sec;   INR: 1.03 ratio      RADIOLOGY

## 2021-04-28 NOTE — PHYSICAL THERAPY INITIAL EVALUATION ADULT - GAIT TRAINING, PT EVAL
Pt. will amb at least 100 ft Ind without an AD by D/C. Pt. will ascend and descend at least 4 steps using HR with Sup by D/C

## 2021-04-28 NOTE — PHYSICAL THERAPY INITIAL EVALUATION ADULT - PERTINENT HX OF CURRENT PROBLEM, REHAB EVAL
9 yo F with pmh of of DMII, HLD, HTN presents to the hospital with 3 weeks of generalized weakness, myalgias and  cough productive of yellow sputum. The patient reports that her symptoms were stable until she developed dyspnea on the day of presentation to the hospital. Upon arrival to the hospital her SpO2 was 88% on room air and she was placed on 3 L n/s with improvement in saturation to 97% and subjective improvement in dyspnea

## 2021-04-28 NOTE — RESEARCH COMMUNICATION NOTE - NS AS RESEARCH COMMUNICATION NOTE FT
Patient meets eligibility for Hep-Covid trial. An informed consent was obtained to be kept in the research chart for the patient.     Case was also discussed with patient's next of kin (Juan) at length over the phone. All Qs answered    A new order for LOVENOX (study dose) will be placed and the dose will be available from [facility].     If you have any Qs you can call Diane (Research Coordinator) on [phone]. You can also contact Dr Hoffman ([phone] or via TEAMS).     Discussed with medical team
Patient underwent lower ext duplex (veinous) r/o DVT as Follow up according to clinical trial protocol. Blood work received by lab.     For outpatient DVT Prophylaxis regimen at the time of discharge, please refer to the most current guidelines regarding Extended DVT Prophylaxis for COVID19 patients: SUNRISE ---> TOOLS (top bar) --->COV19 Treatement Protocols.
This is a reminder that patient is currently enrolled in the Hep-COVID clinical trial (prophylactic Vs therapeutic Lovenox) and is receiving a blinded dose.     No events related to the study drug reported.       For any issue please reach out to the research department ([phone] or Teams) and Dr Hoffman ([phone] or Teams).

## 2021-04-28 NOTE — RESEARCH COMMUNICATION NOTE - NS AS RESEARCH STUDY NAME FT
HepCOVID Clinical Trial     For more information please refer to [facility]: Click on "Tools" then "COVID19 Treatment Protocols and Clinical Trials"

## 2021-04-28 NOTE — CHART NOTE - NSCHARTNOTEFT_GEN_A_CORE
Pt requires home oxygen due to covid pna desaturating with ambulation. Dexamethasone course and tocilizumab have been tried and are unsuccessful. Patient is otherwise in a stable state.     Pt is aware and agreeable to home O2 and will require concentrator and portable O2 sent to pt home.    O2 sat on RA at rest is 94%.   O2 sat on RA on ambulation decreases to 75%.  O2 sat on 3L improves to 92%.

## 2021-04-28 NOTE — DISCHARGE NOTE PROVIDER - NSDCMRMEDTOKEN_GEN_ALL_CORE_FT
amLODIPine 5 mg oral tablet: 1 tab(s) orally once a day  glimepiride 2 mg oral tablet: 1 tab(s) orally once a day  lisinopril 10 mg oral tablet: 1 tab(s) orally once a day  metFORMIN 1000 mg oral tablet: 1 each orally once a day  predniSONE 20 mg oral tablet: 2 tab(s) orally once a day   predniSONE 20 mg oral tablet: 1 tab(s) orally once a day   simvastatin 40 mg oral tablet: 1 tab(s) orally once a day (at bedtime)

## 2021-04-28 NOTE — DISCHARGE NOTE PROVIDER - NSDCCPCAREPLAN_GEN_ALL_CORE_FT
PRINCIPAL DISCHARGE DIAGNOSIS  Diagnosis: COVID-19  Assessment and Plan of Treatment: COVID-19 is the disease caused by a coronavirus first discovered in December 2019. Coronaviruses generally cause upper respiratory (nose, throat, and lung) infections, such as a cold. The new virus can also cause serious lower respiratory conditions, such as pneumonia or acute respiratory distress syndrome (ARDS). The new virus can also affect many other organs, including the brain and heart. Blood vessels can also be affected, leading to blood clots. Anyone can develop serious problems from the new virus, but your risk is higher if you are 65 or older. A weak immune system, diabetes, or a heart or lung condition can also increase your risk. Your risk is also higher if you are a current or former cigarette smoker.  Call your local emergency number (911 in the [place]) or an emergency department if:   •You have trouble breathing or shortness of breath at rest.  •You have chest pain or pressure that lasts longer than 5 minutes.  •You become confused or hard to wake.  •Your lips or face are blue.  •You have a fever of 104°F (40°C) or higher.  Call your doctor if:   •You do not have symptoms of COVID-19 but had close physical contact within 14 days with someone who tested positive.  •You have questions or concerns about your condition or care.

## 2021-04-30 DIAGNOSIS — E11.9 TYPE 2 DIABETES MELLITUS WITHOUT COMPLICATIONS: ICD-10-CM

## 2021-04-30 DIAGNOSIS — U07.1 COVID-19: ICD-10-CM

## 2021-04-30 DIAGNOSIS — E78.5 HYPERLIPIDEMIA, UNSPECIFIED: ICD-10-CM

## 2021-04-30 DIAGNOSIS — I10 ESSENTIAL (PRIMARY) HYPERTENSION: ICD-10-CM

## 2021-04-30 DIAGNOSIS — J96.01 ACUTE RESPIRATORY FAILURE WITH HYPOXIA: ICD-10-CM

## 2022-06-22 PROBLEM — E11.9 TYPE 2 DIABETES MELLITUS WITHOUT COMPLICATIONS: Chronic | Status: ACTIVE | Noted: 2021-04-24

## 2022-06-22 PROBLEM — E78.5 HYPERLIPIDEMIA, UNSPECIFIED: Chronic | Status: ACTIVE | Noted: 2021-04-24

## 2022-06-22 PROBLEM — I10 ESSENTIAL (PRIMARY) HYPERTENSION: Chronic | Status: ACTIVE | Noted: 2021-04-24

## 2022-07-06 ENCOUNTER — OUTPATIENT (OUTPATIENT)
Dept: OUTPATIENT SERVICES | Facility: HOSPITAL | Age: 82
LOS: 1 days | Discharge: HOME | End: 2022-07-06

## 2022-07-06 VITALS
RESPIRATION RATE: 17 BRPM | DIASTOLIC BLOOD PRESSURE: 74 MMHG | HEIGHT: 63 IN | OXYGEN SATURATION: 96 % | SYSTOLIC BLOOD PRESSURE: 164 MMHG | TEMPERATURE: 96 F | WEIGHT: 125 LBS | HEART RATE: 66 BPM

## 2022-07-06 VITALS — RESPIRATION RATE: 17 BRPM | SYSTOLIC BLOOD PRESSURE: 127 MMHG | HEART RATE: 68 BPM | DIASTOLIC BLOOD PRESSURE: 72 MMHG

## 2022-07-06 DIAGNOSIS — Z90.49 ACQUIRED ABSENCE OF OTHER SPECIFIED PARTS OF DIGESTIVE TRACT: Chronic | ICD-10-CM

## 2022-07-06 DIAGNOSIS — Z98.49 CATARACT EXTRACTION STATUS, UNSPECIFIED EYE: Chronic | ICD-10-CM

## 2022-07-06 DIAGNOSIS — Z98.890 OTHER SPECIFIED POSTPROCEDURAL STATES: Chronic | ICD-10-CM

## 2022-07-06 LAB — GLUCOSE BLDC GLUCOMTR-MCNC: 128 MG/DL — HIGH (ref 70–99)

## 2022-07-06 RX ORDER — GLIMEPIRIDE 1 MG
1 TABLET ORAL
Qty: 0 | Refills: 0 | DISCHARGE

## 2022-07-06 RX ORDER — AMLODIPINE BESYLATE 2.5 MG/1
1 TABLET ORAL
Qty: 0 | Refills: 0 | DISCHARGE

## 2022-07-06 RX ORDER — SIMVASTATIN 20 MG/1
1 TABLET, FILM COATED ORAL
Qty: 0 | Refills: 0 | DISCHARGE

## 2022-07-06 RX ORDER — METFORMIN HYDROCHLORIDE 850 MG/1
1 TABLET ORAL
Qty: 0 | Refills: 0 | DISCHARGE

## 2022-07-06 RX ORDER — LISINOPRIL 2.5 MG/1
1 TABLET ORAL
Qty: 0 | Refills: 0 | DISCHARGE

## 2022-07-06 NOTE — ASU PATIENT PROFILE, ADULT - NSICDXPASTSURGICALHX_GEN_ALL_CORE_FT
PAST SURGICAL HISTORY:  H/O colonoscopy 2017    S/P appendectomy     S/P cataract extraction and insertion of intraocular lens

## 2022-07-06 NOTE — ASU PATIENT PROFILE, ADULT - FALL HARM RISK - HARM RISK INTERVENTIONS

## 2022-07-12 DIAGNOSIS — H26.9 UNSPECIFIED CATARACT: ICD-10-CM

## 2022-07-12 DIAGNOSIS — I10 ESSENTIAL (PRIMARY) HYPERTENSION: ICD-10-CM

## 2022-07-12 DIAGNOSIS — Z79.84 LONG TERM (CURRENT) USE OF ORAL HYPOGLYCEMIC DRUGS: ICD-10-CM

## 2022-07-12 DIAGNOSIS — E78.00 PURE HYPERCHOLESTEROLEMIA, UNSPECIFIED: ICD-10-CM

## 2022-07-12 DIAGNOSIS — H21.561 PUPILLARY ABNORMALITY, RIGHT EYE: ICD-10-CM

## 2022-07-12 DIAGNOSIS — Z79.82 LONG TERM (CURRENT) USE OF ASPIRIN: ICD-10-CM

## 2022-07-12 DIAGNOSIS — E11.36 TYPE 2 DIABETES MELLITUS WITH DIABETIC CATARACT: ICD-10-CM

## 2022-07-12 DIAGNOSIS — M81.0 AGE-RELATED OSTEOPOROSIS WITHOUT CURRENT PATHOLOGICAL FRACTURE: ICD-10-CM

## 2024-03-11 NOTE — PHYSICAL THERAPY INITIAL EVALUATION ADULT - PATIENT/FAMILY/SIGNIFICANT OTHER GOALS STATEMENT, PT EVAL
Exam without concerns for acute findings.  Blood work sent to check your vitamins, your blood counts, your thyroid function, and your electrolytes.  We will call you with the results once they return.  Follow up with your already established PCP appointment later this week for continued evaluation and treatment.  Start taking more broad vitamins to help with any vitamin deficiencies. Increase your food iron intake to help with any possible anemia. Stay hydrated.  If any of you symptoms worsen significantly enough to cause concern (such as inability to get out of bed, fainting or sensation that you are about to faint every time you go from sitting to standing) follow up with the ER for more urgent lab results.  If you develop significant abdominal pain, such that you double over or cannot talk through the pain, or if you develop nausea, vomiting, blood in your stool, fevers, or general concern for the pain/symptoms, follow up with the ER for further evaluation.   Pt. wants to go home

## 2025-02-19 ENCOUNTER — APPOINTMENT (OUTPATIENT)
Dept: ORTHOPEDIC SURGERY | Facility: CLINIC | Age: 85
End: 2025-02-19
Payer: MEDICARE

## 2025-02-19 DIAGNOSIS — M18.12 UNILATERAL PRIMARY OSTEOARTHRITIS OF FIRST CARPOMETACARPAL JOINT, LEFT HAND: ICD-10-CM

## 2025-02-19 PROBLEM — Z00.00 ENCOUNTER FOR PREVENTIVE HEALTH EXAMINATION: Status: ACTIVE | Noted: 2025-02-19

## 2025-02-19 PROCEDURE — 73110 X-RAY EXAM OF WRIST: CPT | Mod: LT

## 2025-02-19 PROCEDURE — 99204 OFFICE O/P NEW MOD 45 MIN: CPT

## 2025-03-12 NOTE — ASU PATIENT PROFILE, ADULT - PAIN SCALE PREFERRED, PROFILE
none
Fall with Harm Risk
Verbalized Understanding/Simple: Patient demonstrates quick and easy understanding
